# Patient Record
Sex: MALE | Race: WHITE | Employment: OTHER | URBAN - METROPOLITAN AREA
[De-identification: names, ages, dates, MRNs, and addresses within clinical notes are randomized per-mention and may not be internally consistent; named-entity substitution may affect disease eponyms.]

---

## 2018-06-27 ENCOUNTER — HOSPITAL ENCOUNTER (INPATIENT)
Facility: CLINIC | Age: 38
LOS: 3 days | Discharge: HOME | DRG: 580 | End: 2018-06-30
Attending: EMERGENCY MEDICINE | Admitting: INTERNAL MEDICINE
Payer: COMMERCIAL

## 2018-06-27 ENCOUNTER — APPOINTMENT (OUTPATIENT)
Dept: GENERAL RADIOLOGY | Facility: CLINIC | Age: 38
DRG: 580 | End: 2018-06-27
Attending: EMERGENCY MEDICINE
Payer: COMMERCIAL

## 2018-06-27 DIAGNOSIS — L03.113 CELLULITIS OF HAND, RIGHT: ICD-10-CM

## 2018-06-27 PROBLEM — L03.119 CELLULITIS OF HAND: Status: ACTIVE | Noted: 2018-06-27

## 2018-06-27 LAB
ANION GAP SERPL CALCULATED.3IONS-SCNC: 5 MMOL/L (ref 3–14)
BASOPHILS # BLD AUTO: 0 10E9/L (ref 0–0.2)
BASOPHILS NFR BLD AUTO: 0.2 %
BUN SERPL-MCNC: 16 MG/DL (ref 7–30)
CALCIUM SERPL-MCNC: 8.9 MG/DL (ref 8.5–10.1)
CHLORIDE SERPL-SCNC: 105 MMOL/L (ref 94–109)
CO2 SERPL-SCNC: 27 MMOL/L (ref 20–32)
CREAT SERPL-MCNC: 0.78 MG/DL (ref 0.66–1.25)
CRP SERPL-MCNC: 60.3 MG/L (ref 0–8)
DIFFERENTIAL METHOD BLD: ABNORMAL
EOSINOPHIL # BLD AUTO: 0.3 10E9/L (ref 0–0.7)
EOSINOPHIL NFR BLD AUTO: 1.9 %
ERYTHROCYTE [DISTWIDTH] IN BLOOD BY AUTOMATED COUNT: 12.6 % (ref 10–15)
ERYTHROCYTE [SEDIMENTATION RATE] IN BLOOD BY WESTERGREN METHOD: 8 MM/H (ref 0–15)
GFR SERPL CREATININE-BSD FRML MDRD: >90 ML/MIN/1.7M2
GLUCOSE SERPL-MCNC: 104 MG/DL (ref 70–99)
HCT VFR BLD AUTO: 46.3 % (ref 40–53)
HGB BLD-MCNC: 15.5 G/DL (ref 13.3–17.7)
IMM GRANULOCYTES # BLD: 0.1 10E9/L (ref 0–0.4)
IMM GRANULOCYTES NFR BLD: 0.4 %
LYMPHOCYTES # BLD AUTO: 1.9 10E9/L (ref 0.8–5.3)
LYMPHOCYTES NFR BLD AUTO: 11.3 %
MCH RBC QN AUTO: 30.3 PG (ref 26.5–33)
MCHC RBC AUTO-ENTMCNC: 33.5 G/DL (ref 31.5–36.5)
MCV RBC AUTO: 90 FL (ref 78–100)
MONOCYTES # BLD AUTO: 1.6 10E9/L (ref 0–1.3)
MONOCYTES NFR BLD AUTO: 9.9 %
NEUTROPHILS # BLD AUTO: 12.5 10E9/L (ref 1.6–8.3)
NEUTROPHILS NFR BLD AUTO: 76.3 %
NRBC # BLD AUTO: 0 10*3/UL
NRBC BLD AUTO-RTO: 0 /100
PLATELET # BLD AUTO: 423 10E9/L (ref 150–450)
POTASSIUM SERPL-SCNC: 3.8 MMOL/L (ref 3.4–5.3)
RBC # BLD AUTO: 5.12 10E12/L (ref 4.4–5.9)
SODIUM SERPL-SCNC: 137 MMOL/L (ref 133–144)
WBC # BLD AUTO: 16.4 10E9/L (ref 4–11)

## 2018-06-27 PROCEDURE — 36415 COLL VENOUS BLD VENIPUNCTURE: CPT | Performed by: EMERGENCY MEDICINE

## 2018-06-27 PROCEDURE — 99207 ZZC APP CREDIT; MD BILLING SHARED VISIT: CPT | Performed by: PHYSICIAN ASSISTANT

## 2018-06-27 PROCEDURE — 87040 BLOOD CULTURE FOR BACTERIA: CPT | Performed by: EMERGENCY MEDICINE

## 2018-06-27 PROCEDURE — 25000128 H RX IP 250 OP 636: Performed by: EMERGENCY MEDICINE

## 2018-06-27 PROCEDURE — 99285 EMERGENCY DEPT VISIT HI MDM: CPT | Mod: 25

## 2018-06-27 PROCEDURE — 25000132 ZZH RX MED GY IP 250 OP 250 PS 637: Performed by: PHYSICIAN ASSISTANT

## 2018-06-27 PROCEDURE — 85025 COMPLETE CBC W/AUTO DIFF WBC: CPT | Performed by: EMERGENCY MEDICINE

## 2018-06-27 PROCEDURE — 25000128 H RX IP 250 OP 636: Performed by: PHYSICIAN ASSISTANT

## 2018-06-27 PROCEDURE — 85652 RBC SED RATE AUTOMATED: CPT | Performed by: EMERGENCY MEDICINE

## 2018-06-27 PROCEDURE — 80048 BASIC METABOLIC PNL TOTAL CA: CPT | Performed by: EMERGENCY MEDICINE

## 2018-06-27 PROCEDURE — 86140 C-REACTIVE PROTEIN: CPT | Performed by: EMERGENCY MEDICINE

## 2018-06-27 PROCEDURE — 87070 CULTURE OTHR SPECIMN AEROBIC: CPT | Performed by: EMERGENCY MEDICINE

## 2018-06-27 PROCEDURE — 87077 CULTURE AEROBIC IDENTIFY: CPT | Performed by: EMERGENCY MEDICINE

## 2018-06-27 PROCEDURE — 99222 1ST HOSP IP/OBS MODERATE 55: CPT | Mod: AI | Performed by: INTERNAL MEDICINE

## 2018-06-27 PROCEDURE — 96365 THER/PROPH/DIAG IV INF INIT: CPT

## 2018-06-27 PROCEDURE — 96366 THER/PROPH/DIAG IV INF ADDON: CPT

## 2018-06-27 PROCEDURE — 12000007 ZZH R&B INTERMEDIATE

## 2018-06-27 PROCEDURE — 87186 SC STD MICRODIL/AGAR DIL: CPT | Performed by: EMERGENCY MEDICINE

## 2018-06-27 PROCEDURE — 73130 X-RAY EXAM OF HAND: CPT | Mod: RT

## 2018-06-27 PROCEDURE — 36415 COLL VENOUS BLD VENIPUNCTURE: CPT

## 2018-06-27 RX ORDER — FLUTICASONE PROPIONATE 50 MCG
1 SPRAY, SUSPENSION (ML) NASAL DAILY
COMMUNITY

## 2018-06-27 RX ORDER — HYDROMORPHONE HYDROCHLORIDE 1 MG/ML
0.2 INJECTION, SOLUTION INTRAMUSCULAR; INTRAVENOUS; SUBCUTANEOUS
Status: DISCONTINUED | OUTPATIENT
Start: 2018-06-27 | End: 2018-06-30 | Stop reason: HOSPADM

## 2018-06-27 RX ORDER — AMOXICILLIN 250 MG
2 CAPSULE ORAL 2 TIMES DAILY
Status: DISCONTINUED | OUTPATIENT
Start: 2018-06-27 | End: 2018-06-30 | Stop reason: HOSPADM

## 2018-06-27 RX ORDER — KETOROLAC TROMETHAMINE 15 MG/ML
15 INJECTION, SOLUTION INTRAMUSCULAR; INTRAVENOUS ONCE
Status: DISCONTINUED | OUTPATIENT
Start: 2018-06-27 | End: 2018-06-27

## 2018-06-27 RX ORDER — ONDANSETRON 2 MG/ML
4 INJECTION INTRAMUSCULAR; INTRAVENOUS EVERY 6 HOURS PRN
Status: DISCONTINUED | OUTPATIENT
Start: 2018-06-27 | End: 2018-06-30 | Stop reason: HOSPADM

## 2018-06-27 RX ORDER — AMOXICILLIN 250 MG
1 CAPSULE ORAL 2 TIMES DAILY
Status: DISCONTINUED | OUTPATIENT
Start: 2018-06-27 | End: 2018-06-30 | Stop reason: HOSPADM

## 2018-06-27 RX ORDER — CEFAZOLIN SODIUM 1 G/50ML
1 INJECTION, SOLUTION INTRAVENOUS EVERY 8 HOURS
Status: DISCONTINUED | OUTPATIENT
Start: 2018-06-27 | End: 2018-06-30 | Stop reason: HOSPADM

## 2018-06-27 RX ORDER — ACETAMINOPHEN 325 MG/1
975 TABLET ORAL 3 TIMES DAILY
Status: DISCONTINUED | OUTPATIENT
Start: 2018-06-27 | End: 2018-06-30 | Stop reason: HOSPADM

## 2018-06-27 RX ORDER — HYDRALAZINE HYDROCHLORIDE 20 MG/ML
10 INJECTION INTRAMUSCULAR; INTRAVENOUS EVERY 4 HOURS PRN
Status: DISCONTINUED | OUTPATIENT
Start: 2018-06-27 | End: 2018-06-30 | Stop reason: HOSPADM

## 2018-06-27 RX ORDER — PROCHLORPERAZINE 25 MG
25 SUPPOSITORY, RECTAL RECTAL EVERY 12 HOURS PRN
Status: DISCONTINUED | OUTPATIENT
Start: 2018-06-27 | End: 2018-06-30 | Stop reason: HOSPADM

## 2018-06-27 RX ORDER — NALOXONE HYDROCHLORIDE 0.4 MG/ML
.1-.4 INJECTION, SOLUTION INTRAMUSCULAR; INTRAVENOUS; SUBCUTANEOUS
Status: DISCONTINUED | OUTPATIENT
Start: 2018-06-27 | End: 2018-06-30 | Stop reason: HOSPADM

## 2018-06-27 RX ORDER — ONDANSETRON 4 MG/1
4 TABLET, ORALLY DISINTEGRATING ORAL EVERY 6 HOURS PRN
Status: DISCONTINUED | OUTPATIENT
Start: 2018-06-27 | End: 2018-06-30 | Stop reason: HOSPADM

## 2018-06-27 RX ORDER — OXYCODONE HYDROCHLORIDE 5 MG/1
5-10 TABLET ORAL
Status: DISCONTINUED | OUTPATIENT
Start: 2018-06-27 | End: 2018-06-30

## 2018-06-27 RX ORDER — SODIUM CHLORIDE 9 MG/ML
INJECTION, SOLUTION INTRAVENOUS CONTINUOUS
Status: DISCONTINUED | OUTPATIENT
Start: 2018-06-27 | End: 2018-06-28

## 2018-06-27 RX ORDER — IBUPROFEN 600 MG/1
600 TABLET, FILM COATED ORAL EVERY 6 HOURS PRN
Status: DISCONTINUED | OUTPATIENT
Start: 2018-06-27 | End: 2018-06-30 | Stop reason: HOSPADM

## 2018-06-27 RX ORDER — PROCHLORPERAZINE MALEATE 5 MG
10 TABLET ORAL EVERY 6 HOURS PRN
Status: DISCONTINUED | OUTPATIENT
Start: 2018-06-27 | End: 2018-06-30 | Stop reason: HOSPADM

## 2018-06-27 RX ADMIN — ACETAMINOPHEN 975 MG: 325 TABLET, FILM COATED ORAL at 21:14

## 2018-06-27 RX ADMIN — CEFAZOLIN SODIUM 1 G: 1 INJECTION, SOLUTION INTRAVENOUS at 10:41

## 2018-06-27 RX ADMIN — ACETAMINOPHEN 975 MG: 325 TABLET, FILM COATED ORAL at 16:40

## 2018-06-27 RX ADMIN — CEFAZOLIN SODIUM 1 G: 1 INJECTION, SOLUTION INTRAVENOUS at 17:45

## 2018-06-27 RX ADMIN — SODIUM CHLORIDE: 9 INJECTION, SOLUTION INTRAVENOUS at 14:22

## 2018-06-27 ASSESSMENT — ENCOUNTER SYMPTOMS
WEAKNESS: 0
MYALGIAS: 1
JOINT SWELLING: 1
FEVER: 0
WOUND: 1
COLOR CHANGE: 0
NUMBNESS: 0

## 2018-06-27 ASSESSMENT — ACTIVITIES OF DAILY LIVING (ADL)
ADLS_ACUITY_SCORE: 9
ADLS_ACUITY_SCORE: 9

## 2018-06-27 NOTE — PHARMACY-ADMISSION MEDICATION HISTORY
Admission medication history interview status for this patient is complete. See [facility] admission navigator for allergy information, prior to admission medications and immunization status.     Medication history interview source(s):Patient  Medication history resources (including written lists, pill bottles, clinic record):None  Primary pharmacy: none    Changes made to PTA medication list:  Added: Flonase  Deleted:   Changed:     Actions taken by pharmacist (provider contacted, etc):None     Additional medication history information:None    Medication reconciliation/reorder completed by provider prior to medication history? No    For patients on insulin therapy: No    Prior to Admission medications    Medication Sig Last Dose Taking? Auth Provider   fluticasone (FLONASE) 50 MCG/ACT spray Spray 1 spray into both nostrils daily 6/27/2018 at Unknown time Yes Unknown, Entered By History

## 2018-06-27 NOTE — ED PROVIDER NOTES
History     Chief Complaint:  Hand pain    The history is provided by the patient.      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 The patient reports that 10 days ago he pinched his right hand between a couch and door hinge, resulting in a laceration to the dorsal aspect of his hand. He cleaned the area thoroughly and put a Steri Strip over the laceration. Patient denies any pain or swelling of the hand until yesterday. Hand is still swollen today though states the pain has lessened from yesterday and is currently mild and non-radiating. He reports clear fluid drainage from the wound but no pus. Patient is able to fully extend all fingers but notes increased pain with extension of 2nd-4th digits, mostly between the 3rd and 4th fingers. Flexion results in no pain. Patient has no history of infections or diabetes, fever, vomiting, or any other symptoms. Last tetanus was 5 years ago.    Allergies:  Amoxicillin - does not know reaction and mother cannot remember if he is allergic or not    Medications:    The patient is not currently taking any prescribed medications.     Past Medical History:    The patient does not have any past pertinent medical history.    Past Surgical History:    History reviewed. No pertinent surgical history.    Family History:    History reviewed. No pertinent family history.     Social History:  Smoking status: Never  Alcohol use: Yes  Works laying [employer]    Review of Systems   Constitutional: Negative for fever.   Musculoskeletal: Positive for joint swelling (right hand) and myalgias (right hand).   Skin: Positive for wound. Negative for color change.   Neurological: Negative for weakness and numbness.   All other systems reviewed and are negative.    Physical Exam   Patient Vitals for the past 24 hrs:   BP Temp Temp src Pulse Resp SpO2 Height Weight   06/27/18 0902 (!)  "171/106 99.8  F (37.7  C) Oral 85 16 96 % 1.803 m (5' 11\") 77.6 kg (171 lb)     Physical Exam  General: Well-developed and well-nourished. Well appearing young  man. Cooperative.  Head:  Atraumatic.  Eyes:  Conjunctivae, lids, and sclerae are normal.  ENT:    Normal nose. Moist mucous membranes.  Neck:  Supple. Normal range of motion.  CV:  Right radial pulse 2+  Resp:  No respiratory distress.   GI:  Non-distended.    MS:  Normal ROM. Moderate-severe edema on the dorsum of the right hand with serous drainage from a 1 cm linear wound. Warm as compared to left hand and mildly erythematous. No significant pain with passive extension, though pain with active extension on digits 2-4. No pain with flexion and no thumb involvement.   Skin:  Warm. Non-diaphoretic. No pallor. Wound, erythema as above.   Neuro:  Awake. A&Ox3. Normal strength. Sensation intact to light touch throughout with radial, median, and ulnar nerve testing intact.  Psych: Normal mood and affect. Normal speech.  Vitals reviewed.    Emergency Department Course     Imaging:  Radiographic findings were communicated with the patient who voiced understanding of the findings.    X-ray Right Hand, 3 views:  IMPRESSION: Old fracture deformity of the fifth metacarpal. Exam  otherwise negative.  Result per radiology.     Laboratory:  Wound culture aerobic bacterial: Pending  CBC: WBC 16.4 (H) o/w WNL (HGB 15.5, )  BMP: Glucose 104 (H) o/w WNL (Creatinine 0.78)  Blood Culture x2: Pending  CRP inflammation: 60.3 (H)  SED rate: 8    Interventions:  1041: Ancef 1g IV intermittent infusion    Emergency Department Course:  Past medical records, nursing notes, and vitals reviewed.  0927: I performed an exam of the patient and obtained history, as documented above.     IV inserted and blood drawn.    The patient was sent for a x-ray while in the emergency department, findings above.    Findings and plan explained to the patient who consents to admission. "     1108: Discussed the patient with АЛЕКСАНДР Salmon, who will admit the patient to an inpatient bed for further monitoring, evaluation, and treatment.     Impression & Plan      Medical Decision Making:  Abdon is a 38-year-old man who is a right-handed  who presents with right hand swelling and pain.  Patient has sustained a laceration approximately 10 days ago which he cleaned himself and was doing well until yesterday when the swelling and pain started.  On exam he has moderate to severe edema of the dorsum of the hand with a roughly 1 cm laceration that is draining serous fluid.  I was unable to express purulent discharge and I did obtain a wound culture at the site.  He has no pain with passive extension of the digits though he endorses pain with active extension of digits 2 through 4. There is no involvement of the thumb and he is otherwise neurovascularly intact.  Blood cultures were obtained and patient was empirically treated with Ancef.  While ESR is normal, CRP is elevated at 60.3 and patient has a leukocytosis to 16.4.  The remainder of his labs are unremarkable.  Fortunately, x-ray of the hand reveals no subcutaneous edema or obvious evidence of osteomyelitis.  However, particularly as this is on his dominant hand and has early signs of deeper infection given pain with extension, I believe patient warrants admission for IV antibiotics and further evaluation by hand surgery.  I discussed this plan with him as well as the results of the laboratory and imaging studies and answered all his questions.  He verbalized understanding and is amenable.  I discussed patient's case with Stacia Mclean, hospitalist PA, who accepts admission and has no further orders.    Diagnosis:    ICD-10-CM    1. Cellulitis of hand, right L03.113 Blood culture     Blood culture     CRP inflammation     CRP inflammation     Erythrocyte sedimentation rate auto     Erythrocyte sedimentation rate auto     CANCELED:  Erythrocyte sedimentation rate auto     CANCELED: CRP inflammation     CANCELED: Erythrocyte sedimentation rate auto     CANCELED: CRP inflammation       Disposition:  Admitted to АЛЕКСАНДР Salomn.      Kay Walker  6/27/2018   [facility] EMERGENCY DEPARTMENT  I, Kay Walker, am serving as a scribe at 9:27 AM on 6/27/2018 to document services personally performed by Luisana Linda MD based on my observations and the provider's statements to me.        Luisana Linda MD  06/27/18 0831

## 2018-06-27 NOTE — IP AVS SNAPSHOT
[place] [address] [facility]    [address]    [facility] [address]    Phone:  [phone]    Fax:  [phone]                                       After Visit Summary   6/27/2018    Abdon Wang    MRN: [id number]           After Visit Summary Signature Page     I have received my discharge instructions, and my questions have been answered. I have discussed any challenges I see with this plan with the nurse or doctor.    ..........................................................................................................................................  Patient/Patient Representative Signature      ..........................................................................................................................................  Patient Representative Print Name and Relationship to Patient    ..................................................               ................................................  Date                                            Time    ..........................................................................................................................................  Reviewed by Signature/Title    ...................................................              ..............................................  Date                                                            Time

## 2018-06-27 NOTE — ED NOTES
"[facility]  ED Nurse Handoff Report    Abdon Wang is a 38 year old male   ED Chief complaint: Hand Pain  . ED Diagnosis:   Final diagnoses:   Cellulitis of hand, right     Allergies:   Allergies   Allergen Reactions     Amoxicillin        Code Status: Full Code  Activity level - Baseline/Home:  Independent. Activity Level - Current:   Independent. Lift room needed: No. Bariatric: No   Needed: No   Isolation: No. Infection: Not Applicable.     Vital Signs:   Vitals:    06/27/18 0902   BP: (!) 171/106   Pulse: 85   Resp: 16   Temp: 99.8  F (37.7  C)   TempSrc: Oral   SpO2: 96%   Weight: 77.6 kg (171 lb)   Height: 1.803 m (5' 11\")       Cardiac Rhythm:  ,      Pain level: 0-10 Pain Scale: 2  Patient confused: No. Patient Falls Risk: Yes.   Elimination Status: Has voided   Patient Report - Initial Complaint: laceration on hand and now swollen . Focused Assessment:  Skin - Skin WDL:  WDL except Skin Turgor: slow return to original state Inspection of bony prominences: Procedural focused assessment (identify areas inspected) Skin Integrity: intact; incision(s) Skin Comment: Pt presents with having a skin laceration on the right hand now having swelling associated with it.   Tests Performed:   XR Hand Right G/E 3 Views   Final Result   IMPRESSION: Old fracture deformity of the fifth metacarpal. Exam   otherwise negative.      KIT ABEBE MD        Labs Ordered and Resulted from Time of ED Arrival Up to the Time of Departure from the ED   CBC WITH PLATELETS DIFFERENTIAL - Abnormal; Notable for the following:        Result Value    WBC 16.4 (*)     Absolute Neutrophil 12.5 (*)     Absolute Monocytes 1.6 (*)     All other components within normal limits   BASIC METABOLIC PANEL - Abnormal; Notable for the following:     Glucose 104 (*)     All other components within normal limits   ERYTHROCYTE SEDIMENTATION RATE AUTO   CRP INFLAMMATION   PERIPHERAL IV CATHETER   BLOOD CULTURE   WOUND " CULTURE AEROBIC BACTERIAL   BLOOD CULTURE     .   Treatments provided:   Medications   ketorolac (TORADOL) injection 15 mg (not administered)   ceFAZolin (ANCEF) intermittent infusion 1 g (pre-mix) (1 g Intravenous New Bag 6/27/18 1041)       Family Comments: rah at bedside.   OBS brochure/video discussed/provided to patient:  No  ED Medications:   Medications   ketorolac (TORADOL) injection 15 mg (not administered)   ceFAZolin (ANCEF) intermittent infusion 1 g (pre-mix) (1 g Intravenous New Bag 6/27/18 1041)     Drips infusing:  No  For the majority of the shift, the patient's behavior Green. Interventions performed were monitor  .     Severe Sepsis OR Septic Shock Diagnosis Present: No      ED Nurse Name/Phone Number: Braxton Howell,   11:37 AM    RECEIVING UNIT ED HANDOFF REVIEW    Above ED Nurse Handoff Report was reviewed: Yes  Reviewed by: Lara Talbert on June 27, 2018 at 12:56 PM

## 2018-06-27 NOTE — CONSULTS
[facility] Orthopedic Consultation    Abdon Wang MRN# [id number]   Age: 38 year old YOB: 1980     Date of Admission:  6/27/2018    Reason for consult: Right hand swelling and cellulitis, evaluate if need for I & D       Requesting physician: Stacia Mclean PA-C        Level of consult: Consult, follow and place orders           Assessment and Plan:   Assessment:   1) Right hand cellulitis- no focal fluid collection appreciated but will follow closely.         Plan:   1) I discussed case and plan with ortho trauma surgeon, Dr. Franks:  Recommend soaking right hand in warm, soapy water for 20 minutes 3x per day.   2) No plan for surgery today- ok for patient to eat.  3) Will plan to re-assess tomorrow- keep NPO at midnight tonight in case surgical intervention is needed.   4) Continue IV antibiotics per Hospitalist              Chief Complaint:   Right hand pain and swelling          History of Present Illness:   This patient is a 38 year old male who presents with the following condition requiring a hospital admission:    Patient reports that about 10 days ago he was helping move a couch when he struck the back of his right hand against a door hinge causing a laceration. He reports cleaning the wound a couple times with hydrogen peroxide and covering it with a band aid. He denies any problems with the wound until about midday yesterday when the back of his hand began swelling up and becoming more red. The swelling and redness then extended further into his knuckles and fingers today prompting him to come to ED for further evaluation. He reports a small amount of clear drainage from laceration but no pus. X-rays in ED showed old fracture of fifth metacarpal but were otherwise negative. Patient did have low grade temp as well as leukocytosis to 16.4, elevated CRP 60.3 and normal ESR of 8. Wound culture and blood cultures taken in ED are still pending. Patient is seen in his hospital room.  He is right hand dominant. He denies fevers/chills.  He has moderate edema with erythema along dorsum of right hand extending distally over the 2nd-4th MCP joints to 2nd-4th PIP joints and proximally over 2nd-4th metacarpals and ending at wrist. He has an approximately 1cm open laceration distal to 2nd MCP joint that is draining a small amount of serous fluid.  He reports minor 2/10 pain along dorsum of hand. The pain increases slightly with palpation over 2nd and 3rd MCP joints and proximally over 2nd and 3rd metacarpals. He has mild pain with flexion/extension of his fingers. He is unable to fully extend the fingers. Denies any numbness/tingling in fingers. He denies any pain in wrist or decreased wrist ROM. Denies any streaking up arm.           Past Medical History:   History reviewed. No pertinent past medical history.          Past Surgical History:   History reviewed. No pertinent surgical history.          Social History:     Social History   Substance Use Topics     Smoking status: Never Smoker     Smokeless tobacco: Never Used     Alcohol use Yes             Family History:   No family history on file.          Immunizations:     VACCINE/DOSE   Diptheria   DPT   DTAP   HBIG   Hepatitis A   Hepatitis B   HIB   Influenza   Measles   Meningococcal   MMR   Mumps   Pneumococcal   Polio   Rubella   Small Pox   TDAP   Varicella   Zoster             Allergies:     Allergies   Allergen Reactions     Amoxicillin              Medications:     Current Facility-Administered Medications   Medication     acetaminophen (TYLENOL) tablet 975 mg     ceFAZolin (ANCEF) intermittent infusion 1 g     hydrALAZINE (APRESOLINE) injection 10 mg     HYDROmorphone (PF) (DILAUDID) injection 0.2 mg     ibuprofen (ADVIL/MOTRIN) tablet 600 mg     melatonin tablet 1 mg     naloxone (NARCAN) injection 0.1-0.4 mg     ondansetron (ZOFRAN-ODT) ODT tab 4 mg    Or     ondansetron (ZOFRAN) injection 4 mg     oxyCODONE IR (ROXICODONE) tablet 5-10  "mg     prochlorperazine (COMPAZINE) injection 10 mg    Or     prochlorperazine (COMPAZINE) tablet 10 mg    Or     prochlorperazine (COMPAZINE) Suppository 25 mg     senna-docusate (SENOKOT-S;PERICOLACE) 8.6-50 MG per tablet 1 tablet    Or     senna-docusate (SENOKOT-S;PERICOLACE) 8.6-50 MG per tablet 2 tablet     sodium chloride 0.9% infusion             Review of Systems:   CV: NEGATIVE for chest pain, palpitations or peripheral edema  C: NEGATIVE for fever, chills, change in weight  E/M: NEGATIVE for ear, mouth and throat problems  R: NEGATIVE for significant cough or SOB          Physical Exam:   All vitals have been reviewed  Patient Vitals for the past 24 hrs:   BP Temp Temp src Pulse Resp SpO2 Height Weight   06/27/18 1313 146/81 97.8  F (36.6  C) Oral 73 16 98 % 1.803 m (5' 11\") 78.2 kg (172 lb 4.8 oz)   06/27/18 0902 (!) 171/106 99.8  F (37.7  C) Oral 85 16 96 % 1.803 m (5' 11\") 77.6 kg (171 lb)     No intake or output data in the 24 hours ending 06/27/18 1432  Musculoskeletal:   RUE: Moderate edema with erythema along dorsum of right hand extending distally over the 2nd-4th MCP joints to 2nd-4th PIP joints and proximally over 2nd-4th metacarpals and ending at wrist.   1cm open laceration distal to 2nd MCP joint that is draining a small amount of serous fluid  - No focal fluid collection appreciated  +TTP over 2nd and 3rd MCP joints and proximally over 2nd and 3rd metacarpals  - Neg TTP along wrist or elbow   - Mild pain with passive extension/flexion of fingers- unable to completely extend fingers   - Distal CMS intact in m/r/u distributions   - No pain with wrist ROM  - No lymphangitis   - 2+ radial pulse, cap refill < 3 sec              Data:   All laboratory data reviewed  Results for orders placed or performed during the hospital encounter of 06/27/18   XR Hand Right G/E 3 Views    Narrative    XR HAND RT G/E 3 VW 6/27/2018 11:04 AM    HISTORY: laceration, cellulitis, r/o fx, emphysema, etc.;       " Impression    IMPRESSION: Old fracture deformity of the fifth metacarpal. Exam  otherwise negative.    KIT ABEBE MD   CBC with platelets differential   Result Value Ref Range    WBC 16.4 (H) 4.0 - 11.0 10e9/L    RBC Count 5.12 4.4 - 5.9 10e12/L    Hemoglobin 15.5 13.3 - 17.7 g/dL    Hematocrit 46.3 40.0 - 53.0 %    MCV 90 78 - 100 fl    MCH 30.3 26.5 - 33.0 pg    MCHC 33.5 31.5 - 36.5 g/dL    RDW 12.6 10.0 - 15.0 %    Platelet Count 423 150 - 450 10e9/L    Diff Method Automated Method     % Neutrophils 76.3 %    % Lymphocytes 11.3 %    % Monocytes 9.9 %    % Eosinophils 1.9 %    % Basophils 0.2 %    % Immature Granulocytes 0.4 %    Nucleated RBCs 0 0 /100    Absolute Neutrophil 12.5 (H) 1.6 - 8.3 10e9/L    Absolute Lymphocytes 1.9 0.8 - 5.3 10e9/L    Absolute Monocytes 1.6 (H) 0.0 - 1.3 10e9/L    Absolute Eosinophils 0.3 0.0 - 0.7 10e9/L    Absolute Basophils 0.0 0.0 - 0.2 10e9/L    Abs Immature Granulocytes 0.1 0 - 0.4 10e9/L    Absolute Nucleated RBC 0.0    Basic metabolic panel   Result Value Ref Range    Sodium 137 133 - 144 mmol/L    Potassium 3.8 3.4 - 5.3 mmol/L    Chloride 105 94 - 109 mmol/L    Carbon Dioxide 27 20 - 32 mmol/L    Anion Gap 5 3 - 14 mmol/L    Glucose 104 (H) 70 - 99 mg/dL    Urea Nitrogen 16 7 - 30 mg/dL    Creatinine 0.78 0.66 - 1.25 mg/dL    GFR Estimate >90 >60 mL/min/1.7m2    GFR Estimate If Black >90 >60 mL/min/1.7m2    Calcium 8.9 8.5 - 10.1 mg/dL   CRP inflammation   Result Value Ref Range    CRP Inflammation 60.3 (H) 0.0 - 8.0 mg/L   Erythrocyte sedimentation rate auto   Result Value Ref Range    Sed Rate 8 0 - 15 mm/h   Blood culture   Result Value Ref Range    Specimen Description Blood Left Hand     Special Requests Aerobic and anaerobic bottles received     Culture Micro No growth after 1 hour    Wound Culture Aerobic Bacterial   Result Value Ref Range    Specimen Description Wound Hand     Special Requests Specimen collected in eSwab transport (white cap)     Culture Micro  PENDING    Blood culture   Result Value Ref Range    Specimen Description Blood Right Arm     Special Requests Aerobic and anaerobic bottles received     Culture Micro No growth after 1 hour           Attestation:  I have reviewed today's vital signs, notes, medications, labs and imaging with Dr. Franks.  Amount of time performed on this consult: 40 minutes.    EDITH Odell CNP

## 2018-06-27 NOTE — IP AVS SNAPSHOT
MRN:[id number]                      After Visit Summary   6/27/2018    Abdon Wang    MRN: [id number]           Thank you!     Thank you for choosing [facility] for your care. Our goal is always to provide you with excellent care. Hearing back from our patients is one way we can continue to improve our services. Please take a few minutes to complete the written survey that you may receive in the mail after you visit. If you would like to speak to someone directly about your visit please contact Patient Relations at [phone]. Thank you!          Patient Information     Date Of Birth          1980        Designated Caregiver       Most Recent Value    Caregiver    Will someone help with your care after discharge? yes    Name of designated caregiver Mindi    Caregiver address home      About your hospital stay     You were admitted on:  June 27, 2018 You last received care in the:  [place] [address] [facility]    You were discharged on:  June 30, 2018        Reason for your hospital stay       Irrigation and debridement and incision and drainage of right hand abscess.                  Who to Call     For medical emergencies, please call 911.  For non-urgent questions about your medical care, please call your primary care provider or clinic, None  For questions related to your surgery, please call your surgery clinic        Attending Provider     Provider Specialty    Luisana Linda MD Emergency Medicine    [facility], MD [facility]       Primary Care Provider Fax #    Physician No Ref-Primary [phone]       When to contact your care team       Call your Orthopedic Surgeon's office [phone] during business hours or [phone] after hours if you have any of the following: fevers/chills with temperature greater than 101.5, increased drainage, increased swelling, increased numbness/tingling or increased, uncontrolled pain in your right hand/fingers.                   After Care Instructions     Activity       Your activity upon discharge: activity as tolerated, Do gentle right finger ROM exercises several times each day to decrease stiffness Avoid any strenuous activity or lifting with right hand.            Wound care and dressings       Instructions to care for your wound at home: Continue warm soapy water soaks to right hand for 20 minutes 3x per day as wound heals over next 1-2 weeks. Apply slightly moistened gauze over wound and then cover with dry gauze, kerlix and ACE wrap. .                  Follow-up Appointments     Follow-up and recommended labs and tests        Please make a follow up appointment with your Orthopedic Surgeon, Dr. Maxim Franks, for the week of July 16, 2018 if possible. Call Dr. Franks's care coordinator, Petra, at [phone] to schedule this appointment.                  Pending Results     Date and Time Order Name Status Description    6/28/2018 1132 Wound Culture Aerobic Bacterial Preliminary     6/28/2018 1132 Anaerobic bacterial culture Preliminary     6/27/2018 0950 Blood culture Preliminary     6/27/2018 0945 Wound Culture Aerobic Bacterial Preliminary     6/27/2018 0945 Blood culture Preliminary             Statement of Approval     Ordered          06/30/18 1041  I have reviewed and agree with all the recommendations and orders detailed in this document.  EFFECTIVE NOW     Approved and electronically signed by:  Zurdo Fox MD           06/30/18 1042  I have reviewed and agree with all the recommendations and orders detailed in this document.  EFFECTIVE NOW     Approved and electronically signed by:  Zurdo Fox MD             Admission Information     Date & Time Provider Department Dept. Phone    6/27/2018 [facility] MD [place] [address] [facility] [phone]      Your Vitals Were     Blood Pressure Pulse Temperature Respirations Height Weight    135/72 (BP Location: Left arm) 65 97.9  F (36.6  C)  "(Oral) 16 1.803 m (5' 11\") 78.2 kg (172 lb 4.8 oz)    Pulse Oximetry BMI (Body Mass Index)                98% 24.03 kg/m2          [employer] Information     [employer] lets you send messages to your doctor, view your test results, renew your prescriptions, schedule appointments and more. To sign up, go to [email or web address] . Click on \"Log in\" on the left side of the screen, which will take you to the Welcome page. Then click on \"Sign up Now\" on the right side of the page.     You will be asked to enter the access code listed below, as well as some personal information. Please follow the directions to create your username and password.     Your access code is: [email or web address]  Expires: 2018 10:43 AM     Your access code will  in 90 days. If you need help or a new code, please call your [place] clinic or [phone].        Care EveryWhere ID     This is your Care EveryWhere ID. This could be used by other organizations to access your [place] medical records  [id number]        Equal Access to Services     LEOPOLDO REEDER AH: Hadii tommie Doe, waashleyda leonardo, qaybta kaalmada rip, finn horton. So [facility] [phone].    ATENCIÓN: Si habla español, tiene a lópez disposición servicios gratuitos de asistencia lingüística. Santana al [phone].    We comply with applicable federal civil rights laws and [place] laws. We do not discriminate on the basis of race, color, national origin, age, disability, sex, sexual orientation, or gender identity.               Review of your medicines      START taking        Dose / Directions    cephALEXin 500 MG capsule   Commonly known as:  KEFLEX   Used for:  Cellulitis of hand, right        Dose:  500 mg   Take 1 capsule (500 mg) by mouth 3 times daily for 10 days   Quantity:  30 capsule   Refills:  0         CONTINUE these medicines which have NOT CHANGED        Dose / Directions    fluticasone 50 MCG/ACT spray   Commonly known " as:  FLONASE        Dose:  1 spray   Spray 1 spray into both nostrils daily   Refills:  0            Where to get your medicines      These medications were sent to [id number] - [place], [place] - [address]  [address], [facility] [address]     Phone:  [phone]     cephALEXin 500 MG capsule                Protect others around you: Learn how to safely use, store and throw away your medicines at www.disposemymeds.org.        ANTIBIOTIC INSTRUCTION     You've Been Prescribed an Antibiotic - Now What?  Your healthcare team thinks that you or your loved one might have an infection. Some infections can be treated with antibiotics, which are powerful, life-saving drugs. Like all medications, antibiotics have side effects and should only be used when necessary. There are some important things you should know about your antibiotic treatment.      Your healthcare team may run tests before you start taking an antibiotic.    Your team may take samples (e.g., from your blood, urine or other areas) to run tests to look for bacteria. These test can be important to determine if you need an antibiotic at all and, if you do, which antibiotic will work best.      Within a few days, your healthcare team might change or even stop your antibiotic.    Your team may start you on an antibiotic while they are working to find out what is making you sick.    Your team might change your antibiotic because test results show that a different antibiotic would be better to treat your infection.    In some cases, once your team has more information, they learn that you do not need an antibiotic at all. They may find out that you don't have an infection, or that the antibiotic you're taking won't work against your infection. For example, an infection caused by a virus can't be treated with antibiotics. Staying on an antibiotic when you don't need it is more likely to be harmful than helpful.      You may experience side  effects from your antibiotic.    Like all medications, antibiotics have side effects. Some of these can be serious.    Let you healthcare team know if you have any known allergies when you are admitted to the hospital.    One significant side effect of nearly all antibiotics is the risk of severe and sometimes deadly diarrhea caused by Clostridium difficile (C. Difficile). This occurs when a person takes antibiotics because some good germs are destroyed. Antibiotic use allows C. diificile to take over, putting patients at high risk for this serious infection.    As a patient or caregiver, it is important to understand your or your loved one's antibiotic treatment. It is especially important for caregivers to speak up when patients can't speak for themselves. Here are some important questions to ask your healthcare team.    What infection is this antibiotic treating and how do you know I have that infection?    What side effects might occur from this antibiotic?    How long will I need to take this antibiotic?    Is it safe to take this antibiotic with other medications or supplements (e.g., vitamins) that I am taking?     Are there any special directions I need to know about taking this antibiotic? For example, should I take it with food?    How will I be monitored to know whether my infection is responding to the antibiotic?    What tests may help to make sure the right antibiotic is prescribed for me?      Information provided by:  www.cdc.gov/getsmart  U.S. Department of Health and Human Services  Centers for disease Control and Prevention  National Center for Emerging and Zoonotic Infectious Diseases  Division of Healthcare Quality Promotion             Medication List: This is a list of all your medications and when to take them. Check marks below indicate your daily home schedule. Keep this list as a reference.      Medications           Morning Afternoon Evening Bedtime As Needed    cephALEXin 500 MG capsule    Commonly known as:  KEFLEX   Take 1 capsule (500 mg) by mouth 3 times daily for 10 days                                fluticasone 50 MCG/ACT spray   Commonly known as:  FLONASE   Spray 1 spray into both nostrils daily

## 2018-06-27 NOTE — H&P
History and Physical     Abdon Wang MRN# [id number]   YOB: 1980 Age: 38 year old      Date of Admission:  6/27/2018    Primary care provider: None           Assessment and Plan:   Abdon Wang is a 38 year old right-handed male (who works as a ) without any significant PMH who presents with right hand cellulitis after an injury 10 days ago. He presents with significant edema and erythema over the dorsum of his right hand along with leukocytosis of 16,000 and low-grade fevers.  He will be admitted to the hospital for IV antibiotics, supportive therapies, consultation with hand surgery.    1 Right hand cellulitis status post recent injury-he had sustained a laceration to the dorsum of his hand when his hand was caught between a couch in a door hinge.  He had clean it up himself with hydrogen peroxide and Steri-Stripped.  There is no signs of erythema or swelling until last night.  Today his entire dorsum of his right hand is erythematous and swollen with clear serous drainage coming from his old laceration site.  He has tenderness to palpation over the MCP joint of his middle finger but no significant pain of his other joints with active or passive movements.  Given the extent of his swelling leukocytosis and low-grade fever, he will need to be admitted to the hospital for IV antibiotics and evaluation with orthopedic surgery. He does not appear to have evidence of septic joint or evidence of tenosynovitis at this point that would require immediate surgical intervention.  -Continue right hand elevation and IV Ancef for now.  Pain control as needed  -Last tetanus shot was 5 years ago.    2. HTN: BP elevated in ED, likely 2/2 pain. No hx of HTN.   Will have PRN hydralazine available.     Admit to inpatient status as I expect he will need at least 2 days of IV antibiotics and close monitoring  DVT prophylaxis-low risk encourage ambulation  Full code he has no previous history of  "cellulitis or any infection                Chief Complaint:   Right hand swelling         History of Present Illness:   Abdon Wang is a 38 year old right-handed male who presents with right hand swelling after an injury 10 days ago.  Abdon is a healthy gentleman with no significant past medical history.  He works as a  and is very active with his hands.  10 days ago he had his right hand caught in between a couch in a door hinge and ultimately sustained a 3 cm laceration to the dorsum of his hand.  He had clean it up himself with hydrogen peroxide and Steri-Strip and had no issues with it after.  However yesterday he noticed some swelling from his knuckles and extending to his wrist.  By this morning he had more erythema and swelling to his fingertip and all the way past his wrist that prompted him to come to the emergency room.  He denies any fevers or chills.  He states that his hand is a little tender and feels \"full and tight\"but is able to make a fist with his hand and no significant pain with passive movement.  Workup in the emergency room reveals a white count of 16,000, he had a low-grade temperature of 99.8.    ESR and CRP obtained showed elevated CRP. X-ray performed showed old fracture of the fifth metacarpal.  He had no previous history of cellulitis or skin infection in the past.  His last tetanus shot was 5 years ago.  He was started on IV Ancef and was recommended for admission to the hospital.             Past Medical History:   History reviewed. No pertinent past medical history.            Past Surgical History:   History reviewed. No pertinent surgical history.            Social History:     Social History     Social History     Marital status: Single     Spouse name: N/A     Number of children: N/A     Years of education: N/A     Occupational History     Not on file.     Social History Main Topics     Smoking status: Never Smoker     Smokeless tobacco: Never Used     Alcohol " "use Yes     Drug use: No     Sexual activity: Not on file     Other Topics Concern     Not on file     Social History Narrative     No narrative on file               Family History:   Reviewed and non contributory         Allergies:      Allergies   Allergen Reactions     Amoxicillin                Medications:     Prior to Admission medications    Medication Sig Last Dose Taking? Auth Provider   fluticasone (FLONASE) 50 MCG/ACT spray Spray 1 spray into both nostrils daily 6/27/2018 at Unknown time Yes Unknown, Entered By History              Review of Systems:   A Comprehensive greater than 10 system review of systems was carried out.  Pertinent positives and negatives are noted above.  Otherwise negative for contributory information.            Physical Exam:   Blood pressure (!) 171/106, pulse 85, temperature 99.8  F (37.7  C), temperature source Oral, resp. rate 16, height 1.803 m (5' 11\"), weight 77.6 kg (171 lb), SpO2 96 %.  Exam:  GENERAL:  Comfortable. Non-toxic  PSYCH: pleasant, oriented, No acute distress.  HEENT:  PERRLA. Normal conjunctiva, normal hearing, nasal mucosa and Oropharynx are normal.  NECK:  Supple, no neck vein distention, adenopathy or bruits, normal thyroid.  HEART:  Normal S1, S2 with no murmur, no pericardial rub, gallops or S3 or S4.  LUNGS:  Clear to auscultation, normal Respiratory effort. No wheezing, rales or ronchi.  ABDOMEN:  Soft, no hepatosplenomegaly, normal bowel sounds. Non-tender, non distended.   EXTREMITIES:  No pedal edema,   Right hand with erythema from his PIP joints down to past wrist. Extensive swelling over the same areas especially over the dorsum of the hand. He has a 3 cm healing laceration over the dorsum of the 2-3 MDP with small serous drainage.   Pain with palpation of the middle MCP joint but no significant pain with active and passive extension of his fingers.   NEUROLOGIC:  CN 2-12 intact, BL 5/5 symmetric upper and lower extremity strength, sensation is " intact with no focal deficits.           Data:       Recent Labs  Lab 06/27/18  0911   WBC 16.4*   HGB 15.5   HCT 46.3   MCV 90             Lab Results   Component Value Date     06/27/2018    Lab Results   Component Value Date    CHLORIDE 105 06/27/2018    Lab Results   Component Value Date    BUN 16 06/27/2018      Lab Results   Component Value Date    POTASSIUM 3.8 06/27/2018    Lab Results   Component Value Date    CO2 27 06/27/2018    Lab Results   Component Value Date    CR 0.78 06/27/2018          Recent Labs  Lab 06/27/18  0959 06/27/18  0911   CULT No growth after 1 hour No growth after 1 hour       Recent Labs  Lab 06/27/18  0911   SED 8   CRP 60.3*         Results for orders placed or performed during the hospital encounter of 06/27/18   XR Hand Right G/E 3 Views    Narrative    XR HAND RT G/E 3 VW 6/27/2018 11:04 AM    HISTORY: laceration, cellulitis, r/o fx, emphysema, etc.;       Impression    IMPRESSION: Old fracture deformity of the fifth metacarpal. Exam  otherwise negative.    MD Stacia DENNIS PA-C    This patient was seen and discussed with Dr. Stewart who agrees with the current plans as outlined above.

## 2018-06-27 NOTE — Clinical Note
Admitting Physician: ROBSON PEREZ [[id number]]   [facility] [[id number][facility]   Bed Type: Adult Med/Surg [46]   Special needs: Fall Risk [8]   Bed request comments: BED REQUEST IN AT 4232

## 2018-06-27 NOTE — PLAN OF CARE
Problem: Patient Care Overview  Goal: Plan of Care/Patient Progress Review  Outcome: No Change  Ao, vss. Minimal pain. Right hand outline, dressing applied. IVF infusing

## 2018-06-27 NOTE — ED TRIAGE NOTES
Swelling and pain in right hand that began yesterday.  Small laceration 10 days ago.  Pinched hand between furniture and hinge while working. Last tetranus 5 years ago. Patient alert and oriented x3.  Airway, breathing and circulation intact.

## 2018-06-28 ENCOUNTER — ANESTHESIA (OUTPATIENT)
Dept: SURGERY | Facility: CLINIC | Age: 38
DRG: 580 | End: 2018-06-28
Payer: COMMERCIAL

## 2018-06-28 ENCOUNTER — ANESTHESIA EVENT (OUTPATIENT)
Dept: SURGERY | Facility: CLINIC | Age: 38
DRG: 580 | End: 2018-06-28
Payer: COMMERCIAL

## 2018-06-28 LAB
BASOPHILS # BLD AUTO: 0 10E9/L (ref 0–0.2)
BASOPHILS NFR BLD AUTO: 0.2 %
DIFFERENTIAL METHOD BLD: ABNORMAL
EOSINOPHIL # BLD AUTO: 0.2 10E9/L (ref 0–0.7)
EOSINOPHIL NFR BLD AUTO: 1.9 %
ERYTHROCYTE [DISTWIDTH] IN BLOOD BY AUTOMATED COUNT: 12.6 % (ref 10–15)
GRAM STN SPEC: ABNORMAL
GRAM STN SPEC: ABNORMAL
HCT VFR BLD AUTO: 43.1 % (ref 40–53)
HGB BLD-MCNC: 14.4 G/DL (ref 13.3–17.7)
IMM GRANULOCYTES # BLD: 0 10E9/L (ref 0–0.4)
IMM GRANULOCYTES NFR BLD: 0.3 %
LYMPHOCYTES # BLD AUTO: 1.7 10E9/L (ref 0.8–5.3)
LYMPHOCYTES NFR BLD AUTO: 13 %
Lab: ABNORMAL
MCH RBC QN AUTO: 31.1 PG (ref 26.5–33)
MCHC RBC AUTO-ENTMCNC: 33.4 G/DL (ref 31.5–36.5)
MCV RBC AUTO: 93 FL (ref 78–100)
MONOCYTES # BLD AUTO: 1.3 10E9/L (ref 0–1.3)
MONOCYTES NFR BLD AUTO: 9.9 %
NEUTROPHILS # BLD AUTO: 9.7 10E9/L (ref 1.6–8.3)
NEUTROPHILS NFR BLD AUTO: 74.7 %
NRBC # BLD AUTO: 0 10*3/UL
NRBC BLD AUTO-RTO: 0 /100
PLATELET # BLD AUTO: 347 10E9/L (ref 150–450)
RBC # BLD AUTO: 4.63 10E12/L (ref 4.4–5.9)
SPECIMEN SOURCE: ABNORMAL
WBC # BLD AUTO: 13 10E9/L (ref 4–11)

## 2018-06-28 PROCEDURE — 36000058 ZZH SURGERY LEVEL 3 EA 15 ADDTL MIN: Performed by: ORTHOPAEDIC SURGERY

## 2018-06-28 PROCEDURE — 25000128 H RX IP 250 OP 636: Performed by: ANESTHESIOLOGY

## 2018-06-28 PROCEDURE — 37000009 ZZH ANESTHESIA TECHNICAL FEE, EACH ADDTL 15 MIN: Performed by: ORTHOPAEDIC SURGERY

## 2018-06-28 PROCEDURE — 25000128 H RX IP 250 OP 636: Performed by: PHYSICIAN ASSISTANT

## 2018-06-28 PROCEDURE — 87205 SMEAR GRAM STAIN: CPT | Performed by: ORTHOPAEDIC SURGERY

## 2018-06-28 PROCEDURE — 87075 CULTR BACTERIA EXCEPT BLOOD: CPT | Performed by: ORTHOPAEDIC SURGERY

## 2018-06-28 PROCEDURE — 85025 COMPLETE CBC W/AUTO DIFF WBC: CPT | Performed by: PHYSICIAN ASSISTANT

## 2018-06-28 PROCEDURE — 25000125 ZZHC RX 250: Performed by: ANESTHESIOLOGY

## 2018-06-28 PROCEDURE — 25000566 ZZH SEVOFLURANE, EA 15 MIN: Performed by: ORTHOPAEDIC SURGERY

## 2018-06-28 PROCEDURE — 36415 COLL VENOUS BLD VENIPUNCTURE: CPT | Performed by: PHYSICIAN ASSISTANT

## 2018-06-28 PROCEDURE — 37000008 ZZH ANESTHESIA TECHNICAL FEE, 1ST 30 MIN: Performed by: ORTHOPAEDIC SURGERY

## 2018-06-28 PROCEDURE — 27210794 ZZH OR GENERAL SUPPLY STERILE: Performed by: ORTHOPAEDIC SURGERY

## 2018-06-28 PROCEDURE — 0J9J0ZZ DRAINAGE OF RIGHT HAND SUBCUTANEOUS TISSUE AND FASCIA, OPEN APPROACH: ICD-10-PCS | Performed by: ORTHOPAEDIC SURGERY

## 2018-06-28 PROCEDURE — 87077 CULTURE AEROBIC IDENTIFY: CPT | Performed by: ORTHOPAEDIC SURGERY

## 2018-06-28 PROCEDURE — 99231 SBSQ HOSP IP/OBS SF/LOW 25: CPT | Performed by: INTERNAL MEDICINE

## 2018-06-28 PROCEDURE — 12000007 ZZH R&B INTERMEDIATE

## 2018-06-28 PROCEDURE — 25000128 H RX IP 250 OP 636: Performed by: NURSE ANESTHETIST, CERTIFIED REGISTERED

## 2018-06-28 PROCEDURE — 25000132 ZZH RX MED GY IP 250 OP 250 PS 637: Performed by: PHYSICIAN ASSISTANT

## 2018-06-28 PROCEDURE — 27210995 ZZH RX 272: Performed by: ORTHOPAEDIC SURGERY

## 2018-06-28 PROCEDURE — 87070 CULTURE OTHR SPECIMN AEROBIC: CPT | Performed by: ORTHOPAEDIC SURGERY

## 2018-06-28 PROCEDURE — 40000306 ZZH STATISTIC PRE PROC ASSESS II: Performed by: ORTHOPAEDIC SURGERY

## 2018-06-28 PROCEDURE — 36000056 ZZH SURGERY LEVEL 3 1ST 30 MIN: Performed by: ORTHOPAEDIC SURGERY

## 2018-06-28 PROCEDURE — 71000012 ZZH RECOVERY PHASE 1 LEVEL 1 FIRST HR: Performed by: ORTHOPAEDIC SURGERY

## 2018-06-28 RX ORDER — ONDANSETRON 2 MG/ML
INJECTION INTRAMUSCULAR; INTRAVENOUS PRN
Status: DISCONTINUED | OUTPATIENT
Start: 2018-06-28 | End: 2018-06-28

## 2018-06-28 RX ORDER — SODIUM CHLORIDE, SODIUM LACTATE, POTASSIUM CHLORIDE, CALCIUM CHLORIDE 600; 310; 30; 20 MG/100ML; MG/100ML; MG/100ML; MG/100ML
INJECTION, SOLUTION INTRAVENOUS CONTINUOUS
Status: DISCONTINUED | OUTPATIENT
Start: 2018-06-28 | End: 2018-06-28 | Stop reason: HOSPADM

## 2018-06-28 RX ORDER — ONDANSETRON 4 MG/1
4 TABLET, ORALLY DISINTEGRATING ORAL EVERY 30 MIN PRN
Status: DISCONTINUED | OUTPATIENT
Start: 2018-06-28 | End: 2018-06-28 | Stop reason: HOSPADM

## 2018-06-28 RX ORDER — MAGNESIUM HYDROXIDE 1200 MG/15ML
LIQUID ORAL PRN
Status: DISCONTINUED | OUTPATIENT
Start: 2018-06-28 | End: 2018-06-28 | Stop reason: HOSPADM

## 2018-06-28 RX ORDER — HYDROMORPHONE HYDROCHLORIDE 1 MG/ML
.3-.5 INJECTION, SOLUTION INTRAMUSCULAR; INTRAVENOUS; SUBCUTANEOUS EVERY 10 MIN PRN
Status: DISCONTINUED | OUTPATIENT
Start: 2018-06-28 | End: 2018-06-28 | Stop reason: HOSPADM

## 2018-06-28 RX ORDER — MEPERIDINE HYDROCHLORIDE 50 MG/ML
12.5 INJECTION INTRAMUSCULAR; INTRAVENOUS; SUBCUTANEOUS
Status: DISCONTINUED | OUTPATIENT
Start: 2018-06-28 | End: 2018-06-28 | Stop reason: HOSPADM

## 2018-06-28 RX ORDER — NALOXONE HYDROCHLORIDE 0.4 MG/ML
.1-.4 INJECTION, SOLUTION INTRAMUSCULAR; INTRAVENOUS; SUBCUTANEOUS
Status: DISCONTINUED | OUTPATIENT
Start: 2018-06-28 | End: 2018-06-28 | Stop reason: HOSPADM

## 2018-06-28 RX ORDER — FENTANYL CITRATE 50 UG/ML
25-50 INJECTION, SOLUTION INTRAMUSCULAR; INTRAVENOUS EVERY 5 MIN PRN
Status: DISCONTINUED | OUTPATIENT
Start: 2018-06-28 | End: 2018-06-28 | Stop reason: HOSPADM

## 2018-06-28 RX ORDER — ONDANSETRON 2 MG/ML
4 INJECTION INTRAMUSCULAR; INTRAVENOUS EVERY 30 MIN PRN
Status: DISCONTINUED | OUTPATIENT
Start: 2018-06-28 | End: 2018-06-28 | Stop reason: HOSPADM

## 2018-06-28 RX ORDER — PROPOFOL 10 MG/ML
INJECTION, EMULSION INTRAVENOUS PRN
Status: DISCONTINUED | OUTPATIENT
Start: 2018-06-28 | End: 2018-06-28

## 2018-06-28 RX ORDER — CLINDAMYCIN PHOSPHATE 900 MG/50ML
900 INJECTION, SOLUTION INTRAVENOUS SEE ADMIN INSTRUCTIONS
Status: DISCONTINUED | OUTPATIENT
Start: 2018-06-28 | End: 2018-06-28 | Stop reason: HOSPADM

## 2018-06-28 RX ORDER — KETOROLAC TROMETHAMINE 30 MG/ML
30 INJECTION, SOLUTION INTRAMUSCULAR; INTRAVENOUS EVERY 6 HOURS PRN
Status: DISCONTINUED | OUTPATIENT
Start: 2018-06-28 | End: 2018-06-28 | Stop reason: HOSPADM

## 2018-06-28 RX ORDER — LIDOCAINE 40 MG/G
CREAM TOPICAL
Status: DISCONTINUED | OUTPATIENT
Start: 2018-06-28 | End: 2018-06-28 | Stop reason: HOSPADM

## 2018-06-28 RX ORDER — ALBUTEROL SULFATE 0.83 MG/ML
2.5 SOLUTION RESPIRATORY (INHALATION) EVERY 4 HOURS PRN
Status: DISCONTINUED | OUTPATIENT
Start: 2018-06-28 | End: 2018-06-28 | Stop reason: HOSPADM

## 2018-06-28 RX ORDER — OXYCODONE HYDROCHLORIDE 5 MG/1
5 TABLET ORAL EVERY 4 HOURS PRN
Status: DISCONTINUED | OUTPATIENT
Start: 2018-06-28 | End: 2018-06-30 | Stop reason: HOSPADM

## 2018-06-28 RX ORDER — FENTANYL CITRATE 50 UG/ML
INJECTION, SOLUTION INTRAMUSCULAR; INTRAVENOUS PRN
Status: DISCONTINUED | OUTPATIENT
Start: 2018-06-28 | End: 2018-06-28

## 2018-06-28 RX ORDER — METOPROLOL TARTRATE 1 MG/ML
1-2 INJECTION, SOLUTION INTRAVENOUS EVERY 5 MIN PRN
Status: DISCONTINUED | OUTPATIENT
Start: 2018-06-28 | End: 2018-06-28 | Stop reason: HOSPADM

## 2018-06-28 RX ORDER — FENTANYL CITRATE 50 UG/ML
25-50 INJECTION, SOLUTION INTRAMUSCULAR; INTRAVENOUS
Status: DISCONTINUED | OUTPATIENT
Start: 2018-06-28 | End: 2018-06-28 | Stop reason: HOSPADM

## 2018-06-28 RX ORDER — CLINDAMYCIN PHOSPHATE 900 MG/50ML
900 INJECTION, SOLUTION INTRAVENOUS
Status: DISCONTINUED | OUTPATIENT
Start: 2018-06-28 | End: 2018-06-28 | Stop reason: HOSPADM

## 2018-06-28 RX ORDER — HYDRALAZINE HYDROCHLORIDE 20 MG/ML
2.5-5 INJECTION INTRAMUSCULAR; INTRAVENOUS EVERY 10 MIN PRN
Status: DISCONTINUED | OUTPATIENT
Start: 2018-06-28 | End: 2018-06-28 | Stop reason: HOSPADM

## 2018-06-28 RX ADMIN — ACETAMINOPHEN 975 MG: 325 TABLET, FILM COATED ORAL at 22:43

## 2018-06-28 RX ADMIN — PROPOFOL 200 MG: 10 INJECTION, EMULSION INTRAVENOUS at 11:15

## 2018-06-28 RX ADMIN — MIDAZOLAM 2 MG: 1 INJECTION INTRAMUSCULAR; INTRAVENOUS at 11:08

## 2018-06-28 RX ADMIN — SODIUM CHLORIDE, POTASSIUM CHLORIDE, SODIUM LACTATE AND CALCIUM CHLORIDE: 600; 310; 30; 20 INJECTION, SOLUTION INTRAVENOUS at 11:08

## 2018-06-28 RX ADMIN — CEFAZOLIN SODIUM 1 G: 1 INJECTION, SOLUTION INTRAVENOUS at 09:05

## 2018-06-28 RX ADMIN — SODIUM CHLORIDE: 9 INJECTION, SOLUTION INTRAVENOUS at 01:18

## 2018-06-28 RX ADMIN — SODIUM CHLORIDE, POTASSIUM CHLORIDE, SODIUM LACTATE AND CALCIUM CHLORIDE: 600; 310; 30; 20 INJECTION, SOLUTION INTRAVENOUS at 11:29

## 2018-06-28 RX ADMIN — CEFAZOLIN SODIUM 1 G: 1 INJECTION, SOLUTION INTRAVENOUS at 01:18

## 2018-06-28 RX ADMIN — FENTANYL CITRATE 50 MCG: 50 INJECTION INTRAMUSCULAR; INTRAVENOUS at 12:02

## 2018-06-28 RX ADMIN — FENTANYL CITRATE 50 MCG: 50 INJECTION INTRAMUSCULAR; INTRAVENOUS at 12:40

## 2018-06-28 RX ADMIN — CEFAZOLIN SODIUM 1 G: 1 INJECTION, SOLUTION INTRAVENOUS at 17:29

## 2018-06-28 RX ADMIN — FENTANYL CITRATE 50 MCG: 50 INJECTION INTRAMUSCULAR; INTRAVENOUS at 12:22

## 2018-06-28 RX ADMIN — LIDOCAINE HYDROCHLORIDE 50 MG: 10 INJECTION, SOLUTION EPIDURAL; INFILTRATION; INTRACAUDAL; PERINEURAL at 11:15

## 2018-06-28 RX ADMIN — ONDANSETRON 4 MG: 2 INJECTION INTRAMUSCULAR; INTRAVENOUS at 11:34

## 2018-06-28 RX ADMIN — KETOROLAC TROMETHAMINE 30 MG: 30 INJECTION, SOLUTION INTRAMUSCULAR at 12:03

## 2018-06-28 RX ADMIN — FENTANYL CITRATE 100 MCG: 50 INJECTION, SOLUTION INTRAMUSCULAR; INTRAVENOUS at 11:15

## 2018-06-28 RX ADMIN — ACETAMINOPHEN 975 MG: 325 TABLET, FILM COATED ORAL at 16:23

## 2018-06-28 ASSESSMENT — ACTIVITIES OF DAILY LIVING (ADL)
ADLS_ACUITY_SCORE: 9

## 2018-06-28 ASSESSMENT — PAIN DESCRIPTION - DESCRIPTORS: DESCRIPTORS: POUNDING;SHARP;SHOOTING

## 2018-06-28 NOTE — ANESTHESIA PREPROCEDURE EVALUATION
Anesthesia Evaluation     .             ROS/MED HX    ENT/Pulmonary:  - neg pulmonary ROS     Neurologic:       Cardiovascular:     (+) hypertension----. : . . . :. .       METS/Exercise Tolerance:     Hematologic:         Musculoskeletal:         GI/Hepatic:         Renal/Genitourinary:         Endo:         Psychiatric:         Infectious Disease:   (+) Other Infectious Disease (right hand cellulitis) right hand cellulitis, septic      Malignancy:         Other:                     Physical Exam      Airway   Mallampati: II  TM distance: >3 FB  Neck ROM: full    Dental     Cardiovascular   Rhythm and rate: regular and normal      Pulmonary    breath sounds clear to auscultation                    Anesthesia Plan      History & Physical Review  History and physical reviewed and following examination; no interval change.    ASA Status:  3 .    NPO Status:  > 8 hours    Plan for General and LMA with Intravenous and Propofol induction. Maintenance will be Balanced.    PONV prophylaxis:  Ondansetron (or other 5HT-3)       Postoperative Care  Postoperative pain management:  IV analgesics, Oral pain medications and Multi-modal analgesia.      Consents  Anesthetic plan, risks, benefits and alternatives discussed with:  Patient..                          .

## 2018-06-28 NOTE — PLAN OF CARE
Problem: Patient Care Overview  Goal: Plan of Care/Patient Progress Review  Outcome: Improving  AO, VSS. Pain managed by PRN medication and ice. Dressing CDI, CMS.

## 2018-06-28 NOTE — PROGRESS NOTES
"               [facility]  Hospitalist Progress Note  Name: Abdon Wang    MRN: [id number]  YOB: 1980    Age: 38 year old  Date of admission: 6/27/2018  Primary care provider: No Ref-Primary, Physician      Reason for Stay (Diagnosis): Right hand cellulitis with abscess         Assessment and Plan:      Summary of Stay:  Abdon Wang is a 38 year old right-handed male (who works as a ) without any significant PMH who presents with right hand cellulitis after an injury 10 days ago. He presents with significant edema and erythema over the dorsum of his right hand along with leukocytosis of 16,000 and low-grade fevers.  Patient was placed on IV Ancef.  Orthopedic hand consultation greatly appreciated and patient underwent incision and drainage along with packing of right hand abscess.    Problem List/Plan:  1. Right hand abscess with associated cellulitis: This occurred after a laceration of the dorsum of his hand when it was caught between a low seated in a door.  Orthopedic hand consultation greatly appreciated.  Postop day #0 intraoperative I&D.  Doing well otherwise.  Wound care management as outlined by orthopedics.  Otherwise, continue IV Ancef.  May transition to p.o. Keflex at discharge.  Wound cultures are growing group B strep along with Staphylococcus aureus.  Await final sensitivities.      DVT Prophylaxis: Low Risk/Ambulatory with no VTE prophylaxis indicated  Code Status: Full Code  Discharge Dispo: Home  Estimated Disch Date / # of Days until Disch: Tomorrow if okay with orthopedics        Interval History (Subjective):      Seen postop.  Doing well and denies any fevers or chills.  Right-hand pain is controlled.         Physical Exam:      Vital signs:  Temp: 98.8  F (37.1  C) Temp src: Oral BP: 136/71 Pulse: 79 Heart Rate: 75 Resp: 16 SpO2: 98 % O2 Device: None (Room air)   Height: 180.3 cm (5' 11\") Weight: 78.2 kg (172 lb 4.8 oz)  Estimated body mass " "index is 24.03 kg/(m^2) as calculated from the following:    Height as of this encounter: 1.803 m (5' 11\").    Weight as of this encounter: 78.2 kg (172 lb 4.8 oz).    I/O last 3 completed shifts:  In: 2898 [P.O.:480; I.V.:2418]  Out: -   Vitals:    06/27/18 0902 06/27/18 1313   Weight: 77.6 kg (171 lb) 78.2 kg (172 lb 4.8 oz)       Constitutional: Awake, alert, cooperative, no apparent distress               Skin: No rashes, no cyanosis, dry to touch   Neuro: CN 2-12 intact, no localizing weakness   Extremities: No edema, right hand and wrist is currently dressed and Ace wrap.  CMS is otherwise intact.  Limited extension especially of the third and fourth finger secondary to swelling and discomfort.   HEENT Normocephalic, atraumatic, normal nasal turbinates; oropharynx clear   Neck Supple; nl inspection; trachea midline; no thryomegaly   Psychiatric: A+O x3. Normal affect          Medications:      All current medications were reviewed with changes reflected in problem list.         Data:      All new lab and imaging data was reviewed.   Labs:    Recent Labs  Lab 06/28/18  1132 06/27/18  0959 06/27/18  0950 06/27/18  0911   CULT PENDING  PENDING No growth after 1 day Heavy growthBeta hemolytic Streptococcus group ASusceptibility testing not routinely done*  Heavy growthStaphylococcus speciesSpeciation in progress*  Culture in progress No growth after 1 day       Recent Labs  Lab 06/28/18  0729 06/27/18  0911   WBC 13.0* 16.4*   HGB 14.4 15.5   HCT 43.1 46.3   MCV 93 90    423       Recent Labs  Lab 06/27/18  0911      POTASSIUM 3.8   CHLORIDE 105   CO2 27   ANIONGAP 5   *   BUN 16   CR 0.78   GFRESTIMATED >90   GFRESTBLACK >90   MCKENZIE 8.9      Imaging:   Recent Results (from the past 48 hour(s))   XR Hand Right G/E 3 Views    Narrative    XR HAND RT G/E 3 VW 6/27/2018 11:04 AM    HISTORY: laceration, cellulitis, r/o fx, emphysema, etc.;       Impression    IMPRESSION: Old fracture deformity of " the fifth metacarpal. Exam  otherwise negative.    MD Mt DENNIS [phone]

## 2018-06-28 NOTE — OP NOTE
Procedure Date: 06/28/2018      DATE OF PROCEDURE:  06/28/2018      PREOPERATIVE DIAGNOSIS:  Right hand abscess.      POSTOPERATIVE DIAGNOSIS:  Right hand abscess.      PROCEDURE:  Irrigation and debridement and incision and drainage of right hand abscess.      SURGEON:  Maxim Franks MD      ANESTHESIA:  General.      COMPLICATIONS:  None.        TOURNIQUET TIME:  8 minutes.      INDICATIONS:  The patient is a 38-year-old, right-hand-dominant male who approximately 10 days ago was moving a couch when he pinned his hand between the couch and the door frame.  He suffered a small laceration over the dorsum of his right hand.  He was doing fine and covering it with just a Band-Aid and cleaning it with peroxide.  However, over the last 2 days, he developed significant erythema, swelling and pain in his right hand that was spreading.  He presented to the ER and was admitted to the hospitalist and started on IV antibiotics.  Orthopedics was consulted for evaluation.  Upon evaluation, the patient did have a weeping wound with some clear serous drainage, and after discussing the risks and benefits of different treatment options including managing it nonoperatively with antibiotics versus going in and doing an I and D, we decided that the best and safest way would be incision and drainage of his right hand.  He agreed.      DESCRIPTION OF PROCEDURE:  On the date of the procedure, the patient was met in the preoperative area by the surgeon and anesthesia team.  His right hand was marked by the operative surgeon, and informed consent was obtained.  Discussed the risks of surgery including the risk of bleeding, infection, damage to surrounding structures, risk of recurrence, risk of anesthesia, and he agreed to proceed.  We then brought him to the operating room, placed him on the operating room table in supine position, and general anesthesia was administered.  His right hand was then prepped and draped in the usual sterile  fashion.  Prior to that, a tourniquet was placed preop.  He was already getting antibiotics, so no new antibiotics were given, and a preoperative timeout was performed.  After the timeout, the hand was elevated, and the tourniquet was inflated to 250 mmHg.  We then made a 2 cm incision directly over his old weeping wound, approximately 1 cm on both sides.  Then, a hemostat was used to spread around subcutaneously to search for any abscess pockets.  Although no jasson purulence was discovered, there was a good amount of serous, soapy-appearing fluid that was debrided.  The wound was then copiously irrigated with saline and packed with quarter-inch Iodoform.  We then placed a sterile dressing over the wound and wrapped him up with cast padding and Ace wrap.  The tourniquet was deflated, and the patient was awakened from anesthesia in stable condition.        Postoperatively, he will perform warm hand soaks approximately 3 times a day for 20-30 minutes and continue IV antibiotics.  He will remove his packing starting on postop day #1 sequentially over the next couple of days.         HILARIO GUY MD             D: 2018   T: 2018   MT: HUNTER      Name:     PETTY MISHRA   MRN:      [id number]        Account:        [id number]   :      1980           Procedure Date: 2018      Document: [id number]

## 2018-06-28 NOTE — PLAN OF CARE
Problem: Pain, Acute (Adult)  Goal: Identify Related Risk Factors and Signs and Symptoms  Related risk factors and signs and symptoms are identified upon initiation of Human Response Clinical Practice Guideline (CPG).  Outcome: No Change  VSS, A&Ox4 and independent in room.  Pt redness within marked borders.  Pt denies pain, nausea, numbness.  Pt states that hand feeling better after first abx dose.  Wife present at bedside.  Calling appropriately to make needs known.  Continue POC.

## 2018-06-28 NOTE — ANESTHESIA POSTPROCEDURE EVALUATION
Patient: Abdon Wang    Procedure(s):  COMBINED IRRIGATION AND DEBRIDEMENT Right Hand - Wound Class: IV-Dirty or Infected    Diagnosis:wound  Diagnosis Additional Information: PREOPERATIVE DIAGNOSIS:  Right hand abscess.       POSTOPERATIVE DIAGNOSIS:  Right hand abscess.       PROCEDURE:  Irrigation and debridement and incision and drainage of right hand abscess.         Anesthesia Type:  General, LMA    Note:  Anesthesia Post Evaluation    Patient location during evaluation: PACU  Patient participation: Able to fully participate in evaluation  Level of consciousness: awake  Pain management: adequate  Airway patency: patent  Cardiovascular status: acceptable  Respiratory status: acceptable  Hydration status: acceptable  PONV: controlled     Anesthetic complications: None          Last vitals:  Vitals:    06/28/18 0746 06/28/18 1000 06/28/18 1230   BP: 138/66 146/86 136/81   Pulse: 79     Resp: 16 18 15   Temp: 98.4  F (36.9  C) 99.3  F (37.4  C)    SpO2: 98% 100% 98%         Electronically Signed By: Bob Garza MD  June 28, 2018  12:40 PM

## 2018-06-28 NOTE — PLAN OF CARE
Problem: Patient Care Overview  Goal: Plan of Care/Patient Progress Review  Outcome: No Change  VSS. Cellulitis to R) hand marked. Soaked in warm soapy water x1. On Ancef q 8. NS at 100 mL. Minimal pain - on scheduled tylenol. CHG wipes and shampoo cap completed. Plan: NPO at midnight. Ortho to reevaluate for I&D tomorrow.

## 2018-06-28 NOTE — ANESTHESIA CARE TRANSFER NOTE
Patient: Abdon Wang    Procedure(s):  COMBINED IRRIGATION AND DEBRIDEMENT Right Hand - Wound Class: IV-Dirty or Infected    Diagnosis: wound  Diagnosis Additional Information: No value filed.    Anesthesia Type:   General, LMA     Note:  Airway :Face Mask  Patient transferred to:PACU  Comments: Spont Resps. Follows commands. LMA removed. Maintains Resps. Tx to PACU. Report to RNHandoff Report: Identifed the Patient, Identified the Reponsible Provider, Reviewed the pertinent medical history, Discussed the surgical course, Reviewed Intra-OP anesthesia mangement and issues during anesthesia, Set expectations for post-procedure period and Allowed opportunity for questions and acknowledgement of understanding      Vitals: (Last set prior to Anesthesia Care Transfer)    CRNA VITALS  6/28/2018 1111 - 6/28/2018 1146      6/28/2018             Pulse: 65    SpO2: 100 %                Electronically Signed By: EDITH Faith CRNA  June 28, 2018  11:46 AM

## 2018-06-29 PROCEDURE — 25000128 H RX IP 250 OP 636: Performed by: PHYSICIAN ASSISTANT

## 2018-06-29 PROCEDURE — 12000007 ZZH R&B INTERMEDIATE

## 2018-06-29 PROCEDURE — 25000132 ZZH RX MED GY IP 250 OP 250 PS 637: Performed by: PHYSICIAN ASSISTANT

## 2018-06-29 PROCEDURE — 99231 SBSQ HOSP IP/OBS SF/LOW 25: CPT | Performed by: INTERNAL MEDICINE

## 2018-06-29 PROCEDURE — 99207 ZZC CDG-MDM COMPONENT: MEETS LOW - DOWN CODED: CPT | Performed by: INTERNAL MEDICINE

## 2018-06-29 RX ADMIN — IBUPROFEN 600 MG: 600 TABLET ORAL at 02:08

## 2018-06-29 RX ADMIN — ACETAMINOPHEN 975 MG: 325 TABLET, FILM COATED ORAL at 07:47

## 2018-06-29 RX ADMIN — CEFAZOLIN SODIUM 1 G: 1 INJECTION, SOLUTION INTRAVENOUS at 17:59

## 2018-06-29 RX ADMIN — CEFAZOLIN SODIUM 1 G: 1 INJECTION, SOLUTION INTRAVENOUS at 02:05

## 2018-06-29 RX ADMIN — ACETAMINOPHEN 975 MG: 325 TABLET, FILM COATED ORAL at 21:22

## 2018-06-29 RX ADMIN — ACETAMINOPHEN 975 MG: 325 TABLET, FILM COATED ORAL at 16:41

## 2018-06-29 RX ADMIN — CEFAZOLIN SODIUM 1 G: 1 INJECTION, SOLUTION INTRAVENOUS at 09:52

## 2018-06-29 ASSESSMENT — ACTIVITIES OF DAILY LIVING (ADL)
ADLS_ACUITY_SCORE: 9

## 2018-06-29 NOTE — PROGRESS NOTES
"Orthopedic Surgery Progress Note  6/29/2018  POD #1 S/P Irrigation and debridement and incision and drainage of right hand abscess    S: Patient voices no unexpected ortho complaints today. Denies chest pain, shortness of breath, fever/chills.  Has some minor pain in right hand that is controlled with tylenol and ibuprofen. Reports decreased erythema and slightly improved right hand ROM post-surgery. Packing removed today- continue TID warm soapy water soaks. Ok to d/c home from Ortho standpoint when final sensitivities and antibiotic plan in place per Hospitalist.     O: Blood pressure 134/71, pulse 79, temperature 97.1  F (36.2  C), temperature source Oral, resp. rate 16, height 1.803 m (5' 11\"), weight 78.2 kg (172 lb 4.8 oz), SpO2 98 %.  Lab Results   Component Value Date    HGB 14.4 06/28/2018     No results found for: INR  I/O last 3 completed shifts:  In: 1600 [I.V.:1600]  Out: -      Decreasing overall erythema to dorsal hand and wrist  Open incision to dorsum of right hand- moderate drainage, packing removed  Distal extremity CMSI in m/r/u distributions  Some decreased ROM primarily with flexion and extension of 3rd and 4th digits but improving  2+ radial pulse, cap refill < 3 sec     A: Mr. Wang is doing well status post Procedure(s):  S/P Irrigation and debridement and incision and drainage of right hand abscess    P:   - Pain Control: controlled per patient with Ibuprofen and Tylenol- has at home doesn't need prescription   - Weight Bearing Status: Do gentle finger ROM exercises several times each day to decrease stiffness, avoid and strenuous activity or lifting with right hand   - Wound Care: Continue warm soapy water soaks to right hand for 20 minutes 3x per day as wound heals over next 1-2 weeks. Apply slightly moistened gauze over wound and then cover with dry gauze, kerlix and ACE wrap.   - Disp: Ok to d/c from Ortho standpoint later today vs tomorrow when final abx plan in place per " Hospitalist   - Follow Up: Please make a follow up appointment with your Orthopedic Surgeon, Dr. Maxim Franks, for the week of July 16, 2018 if possible. Call Dr. Franks's care coordinator, Petra, at [phone] to schedule this appointment.        Please page me with any Ortho related questions/concerns on this patient between 7a-5pm today.     Philippe Wiseman, MSN, APRN, NP-C   Nurse Practitioner, [facility]  [facility]   Office Phone: [phone]  Pager: [phone]

## 2018-06-29 NOTE — PROGRESS NOTES
"  [facility]  Hospitalist Progress Note  Zurdo Fox MD 06/29/2018    Reason for Stay (Diagnosis): R hand abscess/cellulitis         Assessment and Plan:      Summary of Stay: Abdon Wang is a 38 year old right-handed male (who works as a ) without any significant PMH who presents with right hand cellulitis after an injury 10 days ago. He presents with significant edema and erythema over the dorsum of his right hand along with leukocytosis of 16,000 and low-grade fevers.  Patient was placed on IV Ancef.  Orthopedic hand consultation greatly appreciated and patient underwent incision and drainage along with packing of right hand abscess.  Await operative culture results     Problem List/Plan:  1. Right hand abscess with associated cellulitis: This occurred after a laceration of the dorsum of his hand when it was caught between a low seated in a door.  Orthopedic hand consultation greatly appreciated.  Postop day #1 intraoperative I&D.  Doing well otherwise.  Wound care management as outlined by orthopedics.  Otherwise, continue IV Ancef.  Wound cultures are growing group B strep along with Staphylococcus aureus.  Await final sensitivities which will determine discharge antibiotic.  I d/w NP from ortho today        DVT Prophylaxis: Low Risk/Ambulatory with no VTE prophylaxis indicated  Code Status: Full Code  Discharge Dispo: Home  1. Estimated Disch Date / # of Days until Disch: Today vs tomorrow when sensitivities back on cx results            Interval History (Subjective):      Feels well.  No complaints.                    Physical Exam:      Last Vital Signs:  /71 (BP Location: Left arm)  Pulse 79  Temp 97.1  F (36.2  C) (Oral)  Resp 16  Ht 1.803 m (5' 11\")  Wt 78.2 kg (172 lb 4.8 oz)  SpO2 98%  BMI 24.03 kg/m2      Intake/Output Summary (Last 24 hours) at 06/29/18 1230  Last data filed at 06/29/18 1145   Gross per 24 hour   Intake              920 ml   Output  "               0 ml   Net              920 ml       Constitutional: Awake, alert, cooperative, no apparent distress   Respiratory: Clear to auscultation bilaterally, no crackles or wheezing   Cardiovascular: Regular rate and rhythm, normal S1 and S2, and no murmur noted   Abdomen: Normal bowel sounds, soft, non-distended, non-tender   Skin: No rashes, no cyanosis, dry to touch.  Redness receced from previous outline of UE   Neuro: Alert and oriented x3, no weakness, numbness, memory loss   Extremities: No edema, normal range of motion   Other(s): R hand wrapped in ACE wrap - was changed by ortho NP earlier this AM.  I left dressing in place       All other systems: Negative          Medications:      All current medications were reviewed with changes reflected in problem list.         Data:      All new lab and imaging data was reviewed.   Labs:    Recent Labs  Lab 06/28/18  0729 06/27/18  0911   WBC 13.0* 16.4*   HGB 14.4 15.5   HCT 43.1 46.3   MCV 93 90    423      Imaging:   No results found for this or any previous visit (from the past 24 hour(s)).

## 2018-06-29 NOTE — PLAN OF CARE
"Problem: Patient Care Overview  Goal: Plan of Care/Patient Progress Review  /76 (BP Location: Left arm)  Pulse 79  Temp 98.8  F (37.1  C) (Oral)  Resp 16  Ht 1.803 m (5' 11\")  Wt 78.2 kg (172 lb 4.8 oz)  SpO2 98%  BMI 24.03 kg/m2  Neuro: A&Ox4  Pain: c/o minimal pain in hand, controlled with scheduled tylenol  Resp: LS clear, On RA, no SOB  Cardiac: WDL  GI/:WDL  Diet: tolerating regular diet, good intake  Skin/mobility: incision to right hand, packing removed by PA, edges approximated, wet to dry dressing changed BID, trace edema to right hand  Tx/plan: IV ancef, BID dressing change, awaiting sensitivities for PO ABX  Will continue to monitor and provide supportive care.          "

## 2018-06-29 NOTE — PLAN OF CARE
Problem: Patient Care Overview  Goal: Plan of Care/Patient Progress Review  Outcome: Improving  VSS continues to have right hand pain on I&D site managed with scheduled tylenol and PRN motrin. Alert/oriented ambulates independent on IV ancef with at bedside. Pt looking forward to D/C.

## 2018-06-30 VITALS
TEMPERATURE: 97.9 F | SYSTOLIC BLOOD PRESSURE: 135 MMHG | BODY MASS INDEX: 24.12 KG/M2 | RESPIRATION RATE: 16 BRPM | HEART RATE: 65 BPM | DIASTOLIC BLOOD PRESSURE: 72 MMHG | HEIGHT: 71 IN | OXYGEN SATURATION: 98 % | WEIGHT: 172.3 LBS

## 2018-06-30 LAB
BACTERIA SPEC CULT: ABNORMAL
BACTERIA SPEC CULT: ABNORMAL
Lab: ABNORMAL
SPECIMEN SOURCE: ABNORMAL

## 2018-06-30 PROCEDURE — 99239 HOSP IP/OBS DSCHRG MGMT >30: CPT | Performed by: INTERNAL MEDICINE

## 2018-06-30 PROCEDURE — 25000132 ZZH RX MED GY IP 250 OP 250 PS 637: Performed by: PHYSICIAN ASSISTANT

## 2018-06-30 PROCEDURE — 25000128 H RX IP 250 OP 636: Performed by: PHYSICIAN ASSISTANT

## 2018-06-30 RX ORDER — CEPHALEXIN 500 MG/1
500 CAPSULE ORAL 3 TIMES DAILY
Qty: 30 CAPSULE | Refills: 0 | Status: SHIPPED | OUTPATIENT
Start: 2018-06-30 | End: 2018-07-10

## 2018-06-30 RX ADMIN — ACETAMINOPHEN 975 MG: 325 TABLET, FILM COATED ORAL at 08:14

## 2018-06-30 RX ADMIN — CEFAZOLIN SODIUM 1 G: 1 INJECTION, SOLUTION INTRAVENOUS at 10:10

## 2018-06-30 RX ADMIN — CEFAZOLIN SODIUM 1 G: 1 INJECTION, SOLUTION INTRAVENOUS at 02:42

## 2018-06-30 ASSESSMENT — ACTIVITIES OF DAILY LIVING (ADL)
ADLS_ACUITY_SCORE: 9

## 2018-06-30 ASSESSMENT — PAIN DESCRIPTION - DESCRIPTORS: DESCRIPTORS: ACHING

## 2018-06-30 NOTE — PLAN OF CARE
Problem: Pain, Acute (Adult)  Goal: Identify Related Risk Factors and Signs and Symptoms  Related risk factors and signs and symptoms are identified upon initiation of Human Response Clinical Practice Guideline (CPG).   Outcome: Improving  VSS. Pt is alert and orientated, up independently. Rt hand wrapped,  fingers swollen, CMS intact. Pain controlled with tylenol. IV ancef given. Plan to d/c today. Will continue with POC.

## 2018-06-30 NOTE — PROGRESS NOTES
Feels well.  No complaints.   No fever.  Pain controlled without need for narcotic pain meds.      Wound cx from ER growing strep and staph species (oxacillin sens)  Wound cx from surgery also growing strep and staph species - though ID on staph pending, suspect will be same organism    D/c today with Keflex.  IF operative cx returns growing resistant bacteria can call in script if needed, but don't think this will be necessary

## 2018-06-30 NOTE — PROGRESS NOTES
Pt ready to dc home with family. IV dc'd. Reviewed avs, prescription efaxed to pharmacy of choice. Dressing supplies provided for future drsg changes. No further needs.

## 2018-06-30 NOTE — DISCHARGE SUMMARY
Admit Date:     06/27/2018   Discharge Date:     06/30/2018      DATE OF ADMISSION:  06/27/2018      DATE OF DISCHARGE:  06/30/2018      PRINCIPAL FINAL DIAGNOSIS:  Right hand abscess with associated cellulitis, status post incision and drainage this admission.  Wound cultures grown out streptococcus and Staphylococcus species.  He is being discharged on oral Keflex for another 10 days.      PRINCIPAL PROCEDURES THIS ADMISSION:   1.  Hand x-ray showing old fracture of the fifth metacarpal, otherwise negative.   2.  Wound culture from the Emergency Department on presentation growing out beta hemolytic streptococcus group A and Staphylococcus pseudintermedius which is resistant to penicillin but sensitive to oxacillin.   3.  Wound culture from operative procedure on 06/20/2018, which is currently growing out beta hemolytic streptococcus group A and moderate growth of a Staphylococcus species yet to be identified.  I suspect this will likely be the same species cultured in the ER culture, but await identification sensitivity on this  admission.      REASON FOR ADMISSION:  Please see dictated history and physical.  In brief, Mr. Wang is a 38-year-old male who presented to the hospital with concerns about right hand pain and redness after an injury 10 days ago.  He was found to have leukocytosis and low-grade fever on presentation.      HOSPITAL COURSE:   1.  Right hand abscess and cellulitis:  The patient underwent incision and drainage by Orthopedic Surgery.  He responded well to surgery and antibiotic therapy.  His initial ER wound cultures are growing out streptococcus as well as a Staphylococcus species as above.  Operative cultures also grew out Streptococcus and Staphylococcus species.  Suspect this will likely be the same organism, although the operative wound cultures are pending.  Staphylococcus species from the ER culture is sensitive to oxacillin.  The patient was treated with IV Ancef while hospitalized  with good response to being discharged on oral Keflex for another 10 days to complete antibiotic course.  If his operative cultures do grow out a staph species that was resistant to Keflex, we will need to change antibiotic, although I do not think this will be the case.  He appeared stable for discharge from the hospital today.      DISCHARGE MEDICATIONS:   1.  Keflex 500 mg 3 times a day for another 10 days.   2.  Flonase 1 spray into both nostrils daily.      DISCHARGE INSTRUCTIONS:   1.  The patient was given wound care instructions on discharge.   2.  Followup appointment with the orthopedic surgeon, Dr. Franks for the week of 2018.  The patient given phone number to make this appointment.      I saw and examined the patient on day of discharge.  I spent greater than 30 minutes discharging this patient from the hospital on the day of discharge.         JODI CHU MD             D: 2018   T: 2018   MT: NACHO      Name:     PETTY MISHRA   MRN:      [id number]        Account:        [id number]   :      1980           Admit Date:     2018                                  Discharge Date: 2018      Document: [id number]

## 2018-07-01 LAB
BACTERIA SPEC CULT: ABNORMAL
BACTERIA SPEC CULT: ABNORMAL
Lab: ABNORMAL
SPECIMEN SOURCE: ABNORMAL

## 2018-07-03 LAB
BACTERIA SPEC CULT: NO GROWTH
BACTERIA SPEC CULT: NO GROWTH
Lab: NORMAL
Lab: NORMAL
SPECIMEN SOURCE: NORMAL
SPECIMEN SOURCE: NORMAL

## 2018-07-05 LAB
BACTERIA SPEC CULT: NORMAL
Lab: NORMAL
SPECIMEN SOURCE: NORMAL

## 2018-09-20 ENCOUNTER — HOSPITAL ENCOUNTER (EMERGENCY)
Facility: CLINIC | Age: 38
Discharge: HOME | End: 2018-09-20
Attending: NURSE PRACTITIONER | Admitting: NURSE PRACTITIONER
Payer: COMMERCIAL

## 2018-09-20 VITALS
DIASTOLIC BLOOD PRESSURE: 98 MMHG | SYSTOLIC BLOOD PRESSURE: 174 MMHG | TEMPERATURE: 98 F | RESPIRATION RATE: 18 BRPM | OXYGEN SATURATION: 99 % | HEART RATE: 86 BPM

## 2018-09-20 DIAGNOSIS — S61.211D LACERATION OF LEFT INDEX FINGER WITHOUT FOREIGN BODY WITHOUT DAMAGE TO NAIL, SUBSEQUENT ENCOUNTER: ICD-10-CM

## 2018-09-20 PROCEDURE — 90715 TDAP VACCINE 7 YRS/> IM: CPT | Performed by: NURSE PRACTITIONER

## 2018-09-20 PROCEDURE — 99283 EMERGENCY DEPT VISIT LOW MDM: CPT | Mod: 25

## 2018-09-20 PROCEDURE — 25000128 H RX IP 250 OP 636: Performed by: NURSE PRACTITIONER

## 2018-09-20 PROCEDURE — 90471 IMMUNIZATION ADMIN: CPT

## 2018-09-20 PROCEDURE — 12001 RPR S/N/AX/GEN/TRNK 2.5CM/<: CPT

## 2018-09-20 RX ADMIN — CLOSTRIDIUM TETANI TOXOID ANTIGEN (FORMALDEHYDE INACTIVATED), CORYNEBACTERIUM DIPHTHERIAE TOXOID ANTIGEN (FORMALDEHYDE INACTIVATED), BORDETELLA PERTUSSIS TOXOID ANTIGEN (GLUTARALDEHYDE INACTIVATED), BORDETELLA PERTUSSIS FILAMENTOUS HEMAGGLUTININ ANTIGEN (FORMALDEHYDE INACTIVATED), BORDETELLA PERTUSSIS PERTACTIN ANTIGEN, AND BORDETELLA PERTUSSIS FIMBRIAE 2/3 ANTIGEN 0.5 ML: 5; 2; 2.5; 5; 3; 5 INJECTION, SUSPENSION INTRAMUSCULAR at 16:10

## 2018-09-20 ASSESSMENT — ENCOUNTER SYMPTOMS: WOUND: 1

## 2018-09-20 NOTE — ED TRIAGE NOTES
Patient cut left pointer finger with a saw, went to  and was sent to ED for repair. Bleeding controlled at this time, pain 5/10. Unknown Tetanus status.

## 2018-09-20 NOTE — ED AVS SNAPSHOT
[facility] Emergency Department    [address]    [facility] [address]    Phone:  [phone]    Fax:  [phone]                                       Abdon Wang   MRN: [id number]    Department:  [facility] Emergency Department   Date of Visit:  9/20/2018           After Visit Summary Signature Page     I have received my discharge instructions, and my questions have been answered. I have discussed any challenges I see with this plan with the nurse or doctor.    ..........................................................................................................................................  Patient/Patient Representative Signature      ..........................................................................................................................................  Patient Representative Print Name and Relationship to Patient    ..................................................               ................................................  Date                                   Time    ..........................................................................................................................................  Reviewed by Signature/Title    ...................................................              ..............................................  Date                                               Time          22EPIC Rev 08/18

## 2018-09-20 NOTE — ED AVS SNAPSHOT
[facility] Emergency Department    [address] [facility] [address]    Phone:  [phone]    Fax:  [phone]                                       Abdon Wang   MRN: [id number]    Department:  [facility] Emergency Department   Date of Visit:  9/20/2018           Patient Information     Date Of Birth          1980        Your diagnoses for this visit were:     Laceration of left index finger without foreign body without damage to nail, subsequent encounter        You were seen by Ophelia Loo, EDITH CNP.      Follow-up Information     Follow up with [facility] In 2 days.    Specialties:  Sports Medicine, Pain Management, Obstetrics & Gynecology, Pediatrics, Internal Medicine, Nephrology    Why:  For wound re-check    Contact information:    [address]  [facility] [address] [phone]        Follow up with [facility] In 1 week.    Specialties:  Sports Medicine, Pain Management, Obstetrics & Gynecology, Pediatrics, Internal Medicine, Nephrology    Why:  For suture removal    Contact information:    [address]  [facility] [id number] [phone]        Follow up with [facility] Emergency Department.    Specialty:  EMERGENCY MEDICINE    Why:  As needed, If symptoms worsen    Contact information:    [address] [facility] [address] [phone]        Follow up with [facility].    Why:  to see Hand MD if needed with any deficit    Contact information:    [address] [place] [address]  [phone]          Discharge Instructions       Discharge Instructions  Laceration (Cut)    You were seen today for a laceration (cut).  Your provider examined your laceration for any problems such a buried foreign body (like glass, a splinter, or gravel), or injury to blood vessels,  tendons, and nerves.  Your provider may have also rinsed and/or scrubbed your laceration to help prevent an infection. It may not be possible to find all problems with your laceration on the first visit; occasionally foreign bodies or a tendon injury can go undetected.    Your laceration may have been closed in one of several ways:    No closure: many wounds will heal just fine without closure.    Stitches: regular stitches that require removal.    Staples: skin staples are often used in the scalp/head.    Wound adhesive (glue): skin glue can be used for certain lacerations and doesn t require removal.    Wound strips (aka Butterfly bandages or steri-strips): these are bandages that help to close a wound.    Absorbable stitches:  dissolving  stitches that go away on their own and usually don t require removal.    A small percentage of wounds will develop an infection regardless of how well the wound is cared for. Antibiotics are generally not indicated to prevent an infection so are only given for a small number of high-risk wounds. Some lacerations are too high risk to close, and are left open to heal because closure can increase the likelihood that an infection will develop.    Remember that all lacerations, no matter how expertly repaired, will cause scarring. We consider many factors, techniques, and materials, in our efforts to provide the best possible cosmetic outcome.    Generally, every Emergency Department visit should have a follow-up clinic visit with either a primary or a specialty clinic/provider. Please follow-up as instructed by your emergency provider today.     Return to the Emergency Department right away if:    You have more redness, swelling, pain, drainage (pus), a bad smell, or red streaking from your laceration as these symptoms could indicate an infection.    You have a fever of 100.4 F or more.    You have bleeding that you cannot stop at home. If your cut starts to bleed, hold pressure on  the bleeding area with a clean cloth or put pressure over the bandage.  If the bleeding does not stop after using constant pressure for 30 minutes, you should return to the Emergency Department for further treatment.    An area past the laceration is cool, pale, or blue compared with the other side, or has a slower return of color when squeezed.    Your dressing seems too tight or starts to get uncomfortable or painful. For children, signs of a problem might be irritability or restlessness.    You have loss of normal function or use of an area, such as being unable to straighten or bend a finger normally.    You have a numb area past the laceration.    Return to the Emergency Department or see your regular provider if:    The laceration starts to come open.     You have something coming out of the cut or a feeling that there is something in the laceration.    Your wound will not heal, or keeps breaking open. There can always be glass, wood, dirt or other things in any wound.  They will not always show up, even on x-rays.  If a wound does not heal, this may be why, and it is important to follow-up with your regular provider.    Home Care:    Take your dressing off in 12-24 hours, or as instructed by your provider, to check your laceration. Remove the dressing sooner if it seems too tight or painful, or if it is getting numb, tingly, or pale past the dressing.    Gently wash your laceration 1-2 times daily with clean water and mild soap. It is okay to shower or run clean water over the laceration, but do not let the laceration soak in water (no swimming).    If your laceration was closed with wound adhesive or strips: pat it dry and leave it open to the air. For all other repairs: after you wash your laceration, or at least 2 times a day, apply antibiotic ointment (such as Neosporin  or Bacitracin ) to the laceration, then cover it with a Band-Aid  or gauze.    Keep the laceration clean. Wear gloves or other  protective clothing if you are around dirt.    Follow-up for removal:    If your wound was closed with staples or regular stitches, they need to be removed according to the instructions and timeline specified by your provider today.    If your wound was closed with absorbable ( dissolving ) sutures, they should fall out, dissolve, or not be visible in about one week. If they are still visible, then they should be removed according to the instructions and timeline specified by your provider today.    Scars:  To help minimize scarring:    Wear sunscreen over the healed laceration when out in the sun.    Massage the area regularly once healed.    You may apply Vitamin E to the healed wound.    Wait. Scars improve in appearance over months and years.    If you were given a prescription for medicine here today, be sure to read all of the information (including the package insert) that comes with your prescription.  This will include important information about the medicine, its side effects, and any warnings that you need to know about.  The pharmacist who fills the prescription can provide more information and answer questions you may have about the medicine.  If you have questions or concerns that the pharmacist cannot address, please call or return to the Emergency Department.       Remember that you can always come back to the Emergency Department if you are not able to see your regular provider in the amount of time listed above, if you get any new symptoms, or if there is anything that worries you.      24 Hour Appointment Hotline       To make an appointment at any [facility], call [phone] ([phone]). If you don't have a family doctor or clinic, we will help you find one. [facility] are conveniently located to serve the needs of you and your family.             Review of your medicines      Our records show that you are taking the medicines listed below. If these are incorrect, please call  your family doctor or clinic.        Dose / Directions Last dose taken    fluticasone 50 MCG/ACT spray   Commonly known as:  FLONASE   Dose:  1 spray        Spray 1 spray into both nostrils daily   Refills:  0                Orders Needing Specimen Collection     None      Pending Results     No orders found from 9/18/2018 to 9/21/2018.            Pending Culture Results     No orders found from 9/18/2018 to 9/21/2018.            Pending Results Instructions     If you had any lab results that were not finalized at the time of your Discharge, you can call the ED Lab Result RN at [phone]. You will be contacted by this team for any positive Lab results or changes in treatment. The nurses are available 7 days a week from 10A to 6:30P.  You can leave a message 24 hours per day and they will return your call.        Test Results From Your Hospital Stay               Clinical Quality Measure: Blood Pressure Screening     Your blood pressure was checked while you were in the emergency department today. The last reading we obtained was  BP: (!) 174/98 . Please read the guidelines below about what these numbers mean and what you should do about them.  If your systolic blood pressure (the top number) is less than 120 and your diastolic blood pressure (the bottom number) is less than 80, then your blood pressure is normal. There is nothing more that you need to do about it.  If your systolic blood pressure (the top number) is 120-139 or your diastolic blood pressure (the bottom number) is 80-89, your blood pressure may be higher than it should be. You should have your blood pressure rechecked within a year by a primary care provider.  If your systolic blood pressure (the top number) is 140 or greater or your diastolic blood pressure (the bottom number) is 90 or greater, you may have high blood pressure. High blood pressure is treatable, but if left untreated over time it can put you at risk for heart attack, stroke, or  "kidney failure. You should have your blood pressure rechecked by a primary care provider within the next 4 weeks.  If your provider in the emergency department today gave you specific instructions to follow-up with your doctor or provider even sooner than that, you should follow that instruction and not wait for up to 4 weeks for your follow-up visit.        Thank you for choosing [facility]       Thank you for choosing [facility] for your care. Our goal is always to provide you with excellent care. Hearing back from our patients is one way we can continue to improve our services. Please take a few minutes to complete the written survey that you may receive in the mail after you visit with us. Thank you!        [employer] Information     [facility] lets you send messages to your doctor, view your test results, renew your prescriptions, schedule appointments and more. To sign up, go to [email or web address] . Click on \"Log in\" on the left side of the screen, which will take you to the Welcome page. Then click on \"Sign up Now\" on the right side of the page.     You will be asked to enter the access code listed below, as well as some personal information. Please follow the directions to create your username and password.     Your access code is: [email or web address]  Expires: 2018 10:43 AM     Your access code will  in 90 days. If you need help or a new code, please call your [facility] clinic or [phone].        Care EveryWhere ID     This is your Care EveryWhere ID. This could be used by other organizations to access your [facility] medical records  [id number]        Equal Access to Services     CURTIS REEDER : Hadii tommie gage Soolive, waaxda luqadaha, qaybta kaalmada rip, finn horton. So [facility] [phone].    ATENCIÓN: Si habla español, tiene a lópez disposición servicios gratuitos de asistencia lingüística. Llame al [phone].    We comply with applicable federal civil rights " laws and [place] laws. We do not discriminate on the basis of race, color, national origin, age, disability, sex, sexual orientation, or gender identity.            After Visit Summary       This is your record. Keep this with you and show to your community pharmacist(s) and doctor(s) at your next visit.

## 2018-09-20 NOTE — ED PROVIDER NOTES
History     Chief Complaint:  Laceration      HPI   Abdon Wang is a 38 year old male who presents with his wife to the Emergency Department for evaluation of a hand laceration. The patient cut his left hand index while using a saw. He was sent here from urgent care. Patient notes his last Tdap was 5 years prior, however this has not been documented.     Allergies:  No Known Drug Allergies     Medications:    Flonase     Past Medical History:    Hand cellulitis     Past Surgical History:    Irrigation&debrigment hand-Right     Family History:    History reviewed. No pertinent family history.    Social History:  Marital Status:  Single   The patient was accompanied to the ED by wife.  Smoking Status: Never  Smokeless Tobacco: Never  Alcohol Use: Yes     Review of Systems   Skin: Positive for wound.   All other systems reviewed and are negative.      Physical Exam   First Vitals:  BP: (!) 174/98  Pulse: 86  Temp: 98  F (36.7  C)  Resp: 18  SpO2: 99 %    Physical Exam  Constitutional: Alert, attentive, GCS 15.    HEENT: Conjunctiva normal. Mucous membranes moist  CV: regular rate and rhythm; no murmurs, rubs or gallups. Cap refill <2seconds.  Respiratory: Effort normal. Lungs clear to auscultation bilaterally. No crackles/rubs/wheezes.  Good air movement.  MSK: left hand: 2 cm jagged, circular laceration over palmar side of distal phalynx on 2nd digit. Skin does not fully approximate. Sensation intact. Full strength with flexion and extension against rtesistance   Neurological: Alert, attentive.  Skin: Skin is warm and dry.  No rashes or petechiae.  Psychiatric: Normal affect.    Emergency Department Course     Procedures:      Laceration Repair      LACERATION:  A stellate clean 2 cm laceration.    LOCATION:  2nd digit left hand.    FUNCTION:  Distally sensation, circulation, motor and tendon function are intact.    ANESTHESIA:  Digital block using 1% lidocaine total of 2 mLs.    PREPARATION:  Irrigation and  Scrubbing with Normal Saline and Shur Clens.    DEBRIDEMENT:  no debridement and wound explored, no foreign body found.    CLOSURE:  Wound was closed with One Layer.  Skin closed with 3 x 5.0 Ethylon using interrupted sutures and steri-strips.    Interventions:  1610: Tdap 0.5mL  IM    Emergency Department Course:  Nursing notes and vitals reviewed.    1601: I performed an exam of the patient as documented above.   Findings and plan explained to the Patient and spouse. Patient discharged home with instructions regarding supportive care, medications, and reasons to return. The importance of close follow-up was reviewed.       Impression & Plan      Medical Decision Making:  Abdon Wang is a 38 year old male who presents for evaluation of a laceration to the left hand.  The wound was carefully evaluated and explored.  Due to jagged nature of the laceration and resulting fragility of skin, laceration was closed with sutures and steri-strips as noted above.  There is no evidence of muscular, tendon, or bony damage with this laceration.  No signs of foreign body.  Possible complications (infection, scarring) were reviewed with the patient.  Follow up with primary care in two days for a wound recheck and in one week for suture removal. Return to ED with increasing pain, evidence of infection, evidence of neurovascular compromise of any further concerns.     Critical Care time:  none    Diagnosis:    ICD-10-CM    1. Laceration of left index finger without foreign body without damage to nail, subsequent encounter S61.211D        Disposition:  discharged to home      Coral Boogie  9/20/2018   [facility] EMERGENCY DEPARTMENT      Scribe Disclosure:  I, Coral Boogie, am serving as a scribe at 4:01 PM on 9/20/2018 to document services personally performed by Ophelia Loo APRN  based on my observations and the provider's statements to me.       Ophelia Loo APRN CNP  09/21/18 1127

## 2021-05-06 ENCOUNTER — VIRTUAL VISIT (OUTPATIENT)
Dept: UROLOGY | Facility: CLINIC | Age: 41
End: 2021-05-06
Payer: COMMERCIAL

## 2021-05-06 ENCOUNTER — TELEPHONE (OUTPATIENT)
Dept: UROLOGY | Facility: CLINIC | Age: 41
End: 2021-05-06

## 2021-05-06 VITALS — WEIGHT: 175 LBS | HEIGHT: 71 IN | BODY MASS INDEX: 24.5 KG/M2

## 2021-05-06 DIAGNOSIS — Z30.2 ENCOUNTER FOR STERILIZATION: Primary | ICD-10-CM

## 2021-05-06 PROCEDURE — 99203 OFFICE O/P NEW LOW 30 MIN: CPT | Mod: GT | Performed by: UROLOGY

## 2021-05-06 ASSESSMENT — PAIN SCALES - GENERAL: PAINLEVEL: NO PAIN (0)

## 2021-05-06 ASSESSMENT — MIFFLIN-ST. JEOR: SCORE: 1715.92

## 2021-05-06 NOTE — TELEPHONE ENCOUNTER
----- Message from Ly Prieto sent at 5/6/2021 11:33 AM CDT -----  We are scheduling a vasectomy in the office in the near future.    BREE

## 2021-05-06 NOTE — LETTER
5/6/2021       RE: Abdon Wang  [address] [facility] [address]     Dear Colleague,    Thank you for referring your patient, Abdon Wang, to the [facility] UROLOGY [facility] at [facility]. Please see a copy of my visit note below.    *SEND LINK TO CELL PHONE*    Delbert is a 40 year old who is being evaluated via a billable video visit.      How would you like to obtain your AVS? MyChart  If the video visit is dropped, the invitation should be resent by: Text to cell phone: [phone]  Will anyone else be joining your video visit? No        VASECTOMY CONSULTATION NOTE  [facility]  [phone]  DATE OF VISIT: 5/6/2021    PATIENT NAME: Abdon Wang    YOB: 1980      REASON FOR CONSULTATION: Mr. Abdon Wang is a 40 year old year old gentleman who is being seen as a virtual visit in the urology clinic today requesting a vasectomy. He has 3 children and he wishes to have a vasectomy for birth control. He has no prior urologic history and has had no prior surgery on the testicles. He has no symptoms in the testicles.    PAST MEDICAL HISTORY: History reviewed. No pertinent past medical history.    PAST SURGICAL HISTORY:   Past Surgical History:   Procedure Laterality Date     IRRIGATION AND DEBRIDEMENT HAND, COMBINED Right 6/28/2018    Procedure: COMBINED IRRIGATION AND DEBRIDEMENT HAND;  Irrigation and debridement and incision and drainage of right hand abscess;  Surgeon: Maxim Franks MD;  Location:  OR       MEDICATIONS:   Current Outpatient Medications:      fluticasone (FLONASE) 50 MCG/ACT spray, Spray 1 spray into both nostrils daily, Disp: , Rfl:     ALLERGIES:   Allergies   Allergen Reactions     Seasonal Allergies        FAMILY HISTORY: No family history on file.    SOCIAL HISTORY:   Social History     Socioeconomic History     Marital status: Single     Spouse name: Not on file     Number of  children: Not on file     Years of education: Not on file     Highest education level: Not on file   Occupational History     Not on file   Social Needs     Financial resource strain: Not on file     Food insecurity     Worry: Not on file     Inability: Not on file     Transportation needs     Medical: Not on file     Non-medical: Not on file   Tobacco Use     Smoking status: Never Smoker     Smokeless tobacco: Never Used   Substance and Sexual Activity     Alcohol use: Yes     Drug use: No     Sexual activity: Not on file   Lifestyle     Physical activity     Days per week: Not on file     Minutes per session: Not on file     Stress: Not on file   Relationships     Social connections     Talks on phone: Not on file     Gets together: Not on file     Attends Congregational service: Not on file     Active member of club or organization: Not on file     Attends meetings of clubs or organizations: Not on file     Relationship status: Not on file     Intimate partner violence     Fear of current or ex partner: Not on file     Emotionally abused: Not on file     Physically abused: Not on file     Forced sexual activity: Not on file   Other Topics Concern     Parent/sibling w/ CABG, MI or angioplasty before 65F 55M? Not Asked   Social History Narrative     Not on file       REVIEW OF SYSTEMS:  Skin: No rash, pruritis, or skin pigmentation  Eyes: No changes in vision  Ears/Nose/Throat: No changes in hearing, no nosebleeds  Respiratory: No shortness of breath, dyspnea on exertion, cough, or hemoptysis  Cardiovascular: No chest pain or palpitations  Gastrointestinal: No diarrhea or constipation. No abdominal pain. No hematochezia  Genitourinary: see HPI  Musculoskeletal: No pain or swelling of joints, normal range of motion  Neurologic: No weakness or tremors  Psychiatric: No recent changes in memory or mood  Hematologic/Lymphatic/Immunologic: No easy bruising or enlarged lymph nodes  Endocrine: No weight gain or  "loss      HEIGHT: 5' 11\"     WEIGHT: 175 lbs 0 oz   BP: Data Unavailable    PULSE: Data Unavailable    EXAM:   General: Alert and oriented to time, place, and self. In NAD   HEENT: Head AT/NC, EOMI, CN Grossly intact   Lungs: no respiratory distress, or pursed lip breathing   Heart: No obvious jugular venous distension present   Musculoskeltal: Normal movements. Normal appearing musculature  Skin: no suspicious lesions or rashes   Neuro: Alert, oriented, speech and mentation normal; moving all 4 extremities equally.   Psych: affect and mood normal      DIAGNOSIS: Request for sterilization    PLAN: The risks of the procedure as well as expectations for recovery and outcomes were splint in detail to him.  He was counseled on the risks for bleeding infection and pain after the procedure.  He was instructed to continue to use contraception until he had proven azoospermia on a semen specimen.  This would normally be collected at least 3 months after the procedure.  He was instructed to hold all anticoagulants medications for one week prior to the procedure.  He was also instructed to shave the scrotum prior to procedure.  It was recommended that he have someone else drive him home after his vasectomy.  In light of these risks and expectations he would like to proceed.  We are scheduling a vasectomy in the office in the near future.  This visit today was performed via video.  He understands that because of this I was not able to examine the testicles in person today.  I will perform an examination at the beginning of the vasectomy and he understands that there is a small chance the procedure may need to be moved to the operating room.    Keron Mcintyre M.D.      Video Start Time: 10:03 AM  Video-Visit Details    Type of service:  Video Visit    Video End Time:10:07 AM    Originating Location (pt. Location): Home    Distant Location (provider location):  [facility] UROLOGY [facility]     Platform used for " Video Visit: Valarie        Again, thank you for allowing me to participate in the care of your patient.      Sincerely,    Keron Mcintyre MD

## 2021-05-06 NOTE — LETTER
Date:May 9, 2021      Patient was self referred, no letter generated. Do not send.        [facility] Health Information

## 2021-05-06 NOTE — PROGRESS NOTES
*SEND LINK TO CELL PHONE*    Delbert is a 40 year old who is being evaluated via a billable video visit.      How would you like to obtain your AVS? MyChart  If the video visit is dropped, the invitation should be resent by: Text to cell phone: [phone]  Will anyone else be joining your video visit? No        VASECTOMY CONSULTATION NOTE  [facility]  [phone]  DATE OF VISIT: 5/6/2021    PATIENT NAME: Abdon Wang    YOB: 1980      REASON FOR CONSULTATION: Mr. Abdon Wang is a 40 year old year old gentleman who is being seen as a virtual visit in the urology clinic today requesting a vasectomy. He has 3 children and he wishes to have a vasectomy for birth control. He has no prior urologic history and has had no prior surgery on the testicles. He has no symptoms in the testicles.    PAST MEDICAL HISTORY: History reviewed. No pertinent past medical history.    PAST SURGICAL HISTORY:   Past Surgical History:   Procedure Laterality Date     IRRIGATION AND DEBRIDEMENT HAND, COMBINED Right 6/28/2018    Procedure: COMBINED IRRIGATION AND DEBRIDEMENT HAND;  Irrigation and debridement and incision and drainage of right hand abscess;  Surgeon: Maxim Franks MD;  Location:  OR       MEDICATIONS:   Current Outpatient Medications:      fluticasone (FLONASE) 50 MCG/ACT spray, Spray 1 spray into both nostrils daily, Disp: , Rfl:     ALLERGIES:   Allergies   Allergen Reactions     Seasonal Allergies        FAMILY HISTORY: No family history on file.    SOCIAL HISTORY:   Social History     Socioeconomic History     Marital status: Single     Spouse name: Not on file     Number of children: Not on file     Years of education: Not on file     Highest education level: Not on file   Occupational History     Not on file   Social Needs     Financial resource strain: Not on file     Food insecurity     Worry: Not on file     Inability: Not on file     Transportation needs     Medical: Not on  "file     Non-medical: Not on file   Tobacco Use     Smoking status: Never Smoker     Smokeless tobacco: Never Used   Substance and Sexual Activity     Alcohol use: Yes     Drug use: No     Sexual activity: Not on file   Lifestyle     Physical activity     Days per week: Not on file     Minutes per session: Not on file     Stress: Not on file   Relationships     Social connections     Talks on phone: Not on file     Gets together: Not on file     Attends Tenriism service: Not on file     Active member of club or organization: Not on file     Attends meetings of clubs or organizations: Not on file     Relationship status: Not on file     Intimate partner violence     Fear of current or ex partner: Not on file     Emotionally abused: Not on file     Physically abused: Not on file     Forced sexual activity: Not on file   Other Topics Concern     Parent/sibling w/ CABG, MI or angioplasty before 65F 55M? Not Asked   Social History Narrative     Not on file       REVIEW OF SYSTEMS:  Skin: No rash, pruritis, or skin pigmentation  Eyes: No changes in vision  Ears/Nose/Throat: No changes in hearing, no nosebleeds  Respiratory: No shortness of breath, dyspnea on exertion, cough, or hemoptysis  Cardiovascular: No chest pain or palpitations  Gastrointestinal: No diarrhea or constipation. No abdominal pain. No hematochezia  Genitourinary: see HPI  Musculoskeletal: No pain or swelling of joints, normal range of motion  Neurologic: No weakness or tremors  Psychiatric: No recent changes in memory or mood  Hematologic/Lymphatic/Immunologic: No easy bruising or enlarged lymph nodes  Endocrine: No weight gain or loss      HEIGHT: 5' 11\"     WEIGHT: 175 lbs 0 oz   BP: Data Unavailable    PULSE: Data Unavailable    EXAM:   General: Alert and oriented to time, place, and self. In NAD   HEENT: Head AT/NC, EOMI, CN Grossly intact   Lungs: no respiratory distress, or pursed lip breathing   Heart: No obvious jugular venous distension " present   Musculoskeltal: Normal movements. Normal appearing musculature  Skin: no suspicious lesions or rashes   Neuro: Alert, oriented, speech and mentation normal; moving all 4 extremities equally.   Psych: affect and mood normal      DIAGNOSIS: Request for sterilization    PLAN: The risks of the procedure as well as expectations for recovery and outcomes were splint in detail to him.  He was counseled on the risks for bleeding infection and pain after the procedure.  He was instructed to continue to use contraception until he had proven azoospermia on a semen specimen.  This would normally be collected at least 3 months after the procedure.  He was instructed to hold all anticoagulants medications for one week prior to the procedure.  He was also instructed to shave the scrotum prior to procedure.  It was recommended that he have someone else drive him home after his vasectomy.  In light of these risks and expectations he would like to proceed.  We are scheduling a vasectomy in the office in the near future.  This visit today was performed via video.  He understands that because of this I was not able to examine the testicles in person today.  I will perform an examination at the beginning of the vasectomy and he understands that there is a small chance the procedure may need to be moved to the operating room.    Keron Mcintyre M.D.      Video Start Time: 10:03 AM  Video-Visit Details    Type of service:  Video Visit    Video End Time:10:07 AM    Originating Location (pt. Location): Home    Distant Location (provider location):  [facility] [place]     Platform used for Video Visit: Valarie

## 2021-05-09 ENCOUNTER — HEALTH MAINTENANCE LETTER (OUTPATIENT)
Age: 41
End: 2021-05-09

## 2021-06-11 ENCOUNTER — OFFICE VISIT (OUTPATIENT)
Dept: UROLOGY | Facility: CLINIC | Age: 41
End: 2021-06-11
Payer: COMMERCIAL

## 2021-06-11 VITALS
DIASTOLIC BLOOD PRESSURE: 72 MMHG | BODY MASS INDEX: 24.5 KG/M2 | SYSTOLIC BLOOD PRESSURE: 138 MMHG | WEIGHT: 175 LBS | HEIGHT: 71 IN

## 2021-06-11 DIAGNOSIS — Z30.2 ENCOUNTER FOR STERILIZATION: Primary | ICD-10-CM

## 2021-06-11 PROCEDURE — 88302 TISSUE EXAM BY PATHOLOGIST: CPT | Performed by: PATHOLOGY

## 2021-06-11 PROCEDURE — 55250 REMOVAL OF SPERM DUCT(S): CPT | Performed by: UROLOGY

## 2021-06-11 RX ORDER — LIDOCAINE HYDROCHLORIDE 20 MG/ML
20 INJECTION, SOLUTION INFILTRATION; PERINEURAL ONCE
Status: COMPLETED | OUTPATIENT
Start: 2021-06-11 | End: 2021-06-11

## 2021-06-11 RX ADMIN — LIDOCAINE HYDROCHLORIDE 20 ML: 20 INJECTION, SOLUTION INFILTRATION; PERINEURAL at 09:45

## 2021-06-11 ASSESSMENT — PAIN SCALES - GENERAL: PAINLEVEL: NO PAIN (0)

## 2021-06-11 ASSESSMENT — MIFFLIN-ST. JEOR: SCORE: 1715.92

## 2021-06-11 NOTE — NURSING NOTE
Chief Complaint   Patient presents with     Sterilization     Vasectomy     Patient has signed the consent form stating that we will be doing a bilateral vasectomy today and that this is the correct procedure.  I verbally confirmed the patient's identity using two indicators, relevant allergies, and that the correct equipment was available. Patient was cleaned and prepped according to the appropriate policy.  Equipment was prepped in a sterile fashion and MD was informed that patient was ready.    Consent read and signed: Yes  Aspirin/blood thinning products stopped 7 days prior to procedure: Yes  Allergies   Allergen Reactions     Seasonal Allergies        Physician performed procedure.  After the procedure the patient was instructed to wait approximately three months and at least 30 ejaculations prior to returning a sample to confirm sterility.  The patient was instructed to bring in sample within 3-6 hours post sample ejaculation.  Any additional medications after the procedure were sent to the patient's pharmacy and instructions were given according to company protocol and the performing physician.  The physician performing the procedure prescribed as they felt appropriate.  Patient was also instructed to avoid heavy lifting or strenuous activity for 10-14 days and to ice the scrotum.  Samples from the R and L vas deferens were sent to the lab and an order was placed for future semen analysis.      The following medication was given:     MEDICATION:  Lidocaine 2% Soln  ROUTE: Infiltration  SITE: Scrotum  DOSE: 12 mL  LOT #: -[id number]  : Hospira  EXPIRATION DATE: 1 JAN 2022  NDC#:  0409-422-16  Was there drug waste? Yes  Amount of drug waste (mL): 8 mL.  Reason for waste:  As per MD  Multi-dose vial: Yes    Shauna Contreras, EMT

## 2021-06-11 NOTE — PATIENT INSTRUCTIONS
POST VASECTOMY INSTRUCTIONS    1.) If you have any concerns or questions, please contact our office at [phone].     2.) It is okay to take a shower, however, do not soak in water (bath,swimming, hot tub,etc....) until your incision is healed.    3.) You might notice some swelling, mild bruising, and discomfort for several days after your vasectomy. This is to be expected. For at least the next 24 hours, an ice pack should be applied for 20 minutes every hour that you are awake. Ice will help with discomfort and swelling. Do not place directly on the skin.    4.) No intercourse, strenuous activity or exercise for at least 7-10 days, even if you feel fine.    5.) You need to wear good scrotal support while you are healing. We strongly recommend an athletic supporter or a pair of regular briefs that are one size too small. Boxer briefs do not offer enough support.    6.) Tylenol as directed on the bottle is preferred for discomfort. Please avoid any blood thinning products such as ibuprofen and aspirin (Motrin, Advil, Excedrin, Aleve, ect..) for at least the next week.    7.) It is normal to have mild drainage from the incision area for several days. However, please contact our office if you notice: bright red blood that does not stop after three days, increased pain, heat at the incision, red streaks, foul smelling discharge, or if you start to run a fever.     8.) YOU MUST CONTINUE BIRTH CONTROL UNTIL WE CONFIRM YOUR STERILITY.  This process can take up to a year to complete (rare occurrence).     9.) You have been given a form with specimen cup and instructions for your follow up specimen. You will be cleared once we receive ONE negative specimen. If your specimen comes back positive (sperm seen) you will be asked to repeat the test. This does not mean that your vasectomy has failed.

## 2021-06-11 NOTE — PROGRESS NOTES
OFFICE VASECTOMY OPERATIVE NOTE  [facility]  ([phone]    DATE: 06/11/21  PATIENT: Abdon Wang    YOB: 1980    Abdon Wang is a 40 year old male.  He has 3 children and he wishes a vasectomy for birth control.  He has read the brochure and he has shaved himself.  I reviewed the vasectomy procedure with him explaining that it would be done with a local anesthetic given just in the location where the vasectomy would be done.  It would be done through scalpel-less incisions with the removal of segments of the vasa, cauterization of the ends, and burying the ends separate with sutures.      Pt. Understands:  1/1000-1/3000 risk of future pregnancy even with perfectly done vasectomy  -vasectomy is a permanent procedure    -he may cryopreserve sperm if he wishes   -1-5% risk of post-vasectomy pain syndrome   -1-5% risk of complication, primarily infection or bleeding  - he needs to have a semen sample that shows no sperm before getting approval for unprotected intercourse.      Complications such as bleeding, infection, and damage to other tissues in the area were discussed.  I recommended that an ice bag be placed on the scrotum off and on tonight to help reduce pain and swelling.      He was reminded that he was not sterile immediately after the vasectomy that it would take at least 20 ejaculations to empty the vas of any remaining sperm.  He was not to provide a semen sample until after the 20th ejaculation and not before 12 weeks after the vas. He was  to fulfill both of those requirements.   He understands it is his responsibility to find out the results of the vas before proceeding with intercourse without birth control protection.  Other items discussed were activity afterwards, returning to work, voluntary physical activity,  resuming sexual activity, clothing to wear, bathing, and care of the vas site and expected changes in the site as healing progresses.  After  signing the permit, bilateral vasectomy was done as described through scalpel-less incisions.       ANESTHESIA: Local    DETAILS OF PROCEDURE: The risks of the procedure were explained in detail to the patient and informed consent was obtained. The patient was placed supine on the procedure table and the penis and scrotum were prepped and draped in the standard sterile fashion. The right vas deferens was isolated and brought up to the median raphe of the scrotum. 1% lidocaine local anesthesia was used to infiltrate the skin and the spermatic cord. A sharp hemostat was used to make a skin puncture. Adventitial tissues were swept away from the vas. A 1 cm segment of the vas was excised and sent for pathology. The proximal and distal lumina of the vas were cauterized and then each segment was tied off in a knuckling-fashion with a 3-0 chromic suture. Hemostasis was ensured and the segments were released back into the scrotum. Next the left vas was brought up to the same incision and a vasectomy was performed in the similar fashion. At the end of the procedure a single 3-0 chromic suture was placed in the skin.     COMPLICATIONS: None    DISMISSAL INSTRUCTIONS:  - Ice pack to scrotum 15 to 20 minutes each hour awake for 36 to 40 hours.  - No strenuous activity or ejaculation for 14 days.  - No unprotected sexual activity until proven azoospermia on semen samples at 3 months.  - Referred to patient handout for normal postop expectations and indications to contact nurse or physician.    M.D.: Keron Mcintyre M.D.

## 2021-06-11 NOTE — LETTER
Date:June 27, 2021      Patient was self referred, no letter generated. Do not send.        [facility] Health Information

## 2021-06-11 NOTE — LETTER
6/11/2021       RE: Abdon Wang  [address] [facility] [id number]     Dear Colleague,    Thank you for referring your patient, Abdon Wang, to the [facility] [place] [facility]. Please see a copy of my visit note below.    OFFICE VASECTOMY OPERATIVE NOTE  [facility]  ([phone]    DATE: 06/11/21  PATIENT: Abdon Wang    YOB: 1980    Abdon Wang is a 40 year old male.  He has 3 children and he wishes a vasectomy for birth control.  He has read the brochure and he has shaved himself.  I reviewed the vasectomy procedure with him explaining that it would be done with a local anesthetic given just in the location where the vasectomy would be done.  It would be done through scalpel-less incisions with the removal of segments of the vasa, cauterization of the ends, and burying the ends separate with sutures.      Pt. Understands:  1/1000-1/3000 risk of future pregnancy even with perfectly done vasectomy  -vasectomy is a permanent procedure    -he may cryopreserve sperm if he wishes   -1-5% risk of post-vasectomy pain syndrome   -1-5% risk of complication, primarily infection or bleeding  - he needs to have a semen sample that shows no sperm before getting approval for unprotected intercourse.      Complications such as bleeding, infection, and damage to other tissues in the area were discussed.  I recommended that an ice bag be placed on the scrotum off and on tonight to help reduce pain and swelling.      He was reminded that he was not sterile immediately after the vasectomy that it would take at least 20 ejaculations to empty the vas of any remaining sperm.  He was not to provide a semen sample until after the 20th ejaculation and not before 12 weeks after the vas. He was  to fulfill both of those requirements.   He understands it is his responsibility to find out the results of  the vas before proceeding with intercourse without birth control protection.  Other items discussed were activity afterwards, returning to work, voluntary physical activity,  resuming sexual activity, clothing to wear, bathing, and care of the vas site and expected changes in the site as healing progresses.  After signing the permit, bilateral vasectomy was done as described through scalpel-less incisions.       ANESTHESIA: Local    DETAILS OF PROCEDURE: The risks of the procedure were explained in detail to the patient and informed consent was obtained. The patient was placed supine on the procedure table and the penis and scrotum were prepped and draped in the standard sterile fashion. The right vas deferens was isolated and brought up to the median raphe of the scrotum. 1% lidocaine local anesthesia was used to infiltrate the skin and the spermatic cord. A sharp hemostat was used to make a skin puncture. Adventitial tissues were swept away from the vas. A 1 cm segment of the vas was excised and sent for pathology. The proximal and distal lumina of the vas were cauterized and then each segment was tied off in a knuckling-fashion with a 3-0 chromic suture. Hemostasis was ensured and the segments were released back into the scrotum. Next the left vas was brought up to the same incision and a vasectomy was performed in the similar fashion. At the end of the procedure a single 3-0 chromic suture was placed in the skin.     COMPLICATIONS: None    DISMISSAL INSTRUCTIONS:  - Ice pack to scrotum 15 to 20 minutes each hour awake for 36 to 40 hours.  - No strenuous activity or ejaculation for 14 days.  - No unprotected sexual activity until proven azoospermia on semen samples at 3 months.  - Referred to patient handout for normal postop expectations and indications to contact nurse or physician.    M.D.: Keron Mcintyre M.D.         Again, thank you for allowing me to participate in the care of your patient.       Sincerely,    Keron Mcintyre MD

## 2021-06-15 LAB — COPATH REPORT: NORMAL

## 2021-09-14 ENCOUNTER — LAB (OUTPATIENT)
Dept: LAB | Facility: CLINIC | Age: 41
End: 2021-09-14
Payer: COMMERCIAL

## 2021-09-14 DIAGNOSIS — Z30.2 ENCOUNTER FOR VASECTOMY: Primary | ICD-10-CM

## 2021-09-14 LAB — SEMEN ANALYSIS P VAS PNL: NORMAL

## 2021-09-14 PROCEDURE — 89321 SEMEN ANAL SPERM DETECTION: CPT

## 2021-10-24 ENCOUNTER — HEALTH MAINTENANCE LETTER (OUTPATIENT)
Age: 41
End: 2021-10-24

## 2022-06-05 ENCOUNTER — HEALTH MAINTENANCE LETTER (OUTPATIENT)
Age: 42
End: 2022-06-05

## 2022-10-15 ENCOUNTER — HEALTH MAINTENANCE LETTER (OUTPATIENT)
Age: 42
End: 2022-10-15

## 2023-06-11 ENCOUNTER — HEALTH MAINTENANCE LETTER (OUTPATIENT)
Age: 43
End: 2023-06-11

## 2024-08-04 ENCOUNTER — HEALTH MAINTENANCE LETTER (OUTPATIENT)
Age: 44
End: 2024-08-04

## (undated) DEVICE — LINEN HALF SHEET 5512

## (undated) DEVICE — CAST PADDING 4" STERILE 9044S

## (undated) DEVICE — APPLICATOR COTTON TIP 6"X2 STERILE LF 6012

## (undated) DEVICE — GLOVE PROTEXIS BLUE W/NEU-THERA 8.5  2D73EB85

## (undated) DEVICE — KIT CULTURE ESWAB AEROBE/ANAEROBE WHITE TOP R723480

## (undated) DEVICE — GLOVE PROTEXIS POWDER FREE 8.5 ORTHOPEDIC 2D73ET85

## (undated) DEVICE — SOL NACL 0.9% IRRIG 1000ML BOTTLE 2F7124

## (undated) DEVICE — PREP SKIN SCRUB TRAY 4461A

## (undated) DEVICE — PACK HAND SOP32HARMO

## (undated) DEVICE — BAG CLEAR TRASH 1.3M 39X33" P4040C

## (undated) DEVICE — LINEN FULL SHEET 5511

## (undated) DEVICE — TUBING SUCTION 12"X1/4" N612

## (undated) DEVICE — TOURNIQUET CUFF 18" REPRO RED 60-7070-103

## (undated) DEVICE — PREP POVIDONE IODINE SCRUB 7.5% 4OZ APL82212

## (undated) DEVICE — GLOVE ESTEEM POWDER FREE SMT 8.5 2D72PT85

## (undated) DEVICE — GOWN IMPERVIOUS SPECIALTY XLG/XLONG 32474

## (undated) DEVICE — PREP POVIDONE IODINE SOLUTION 10% 4OZ

## (undated) DEVICE — SUCTION TIP YANKAUER W/O VENT K86

## (undated) DEVICE — TUBE CULTURE ANAEROBIC PORT-A-CUL 11ML

## (undated) DEVICE — SYR BULB IRRIG 50ML LATEX FREE 0035280

## (undated) RX ORDER — LIDOCAINE HYDROCHLORIDE 10 MG/ML
INJECTION, SOLUTION EPIDURAL; INFILTRATION; INTRACAUDAL; PERINEURAL
Status: DISPENSED
Start: 2018-06-28

## (undated) RX ORDER — ONDANSETRON 2 MG/ML
INJECTION INTRAMUSCULAR; INTRAVENOUS
Status: DISPENSED
Start: 2018-06-28

## (undated) RX ORDER — LIDOCAINE HYDROCHLORIDE 10 MG/ML
INJECTION, SOLUTION EPIDURAL; INFILTRATION; INTRACAUDAL; PERINEURAL
Status: DISPENSED
Start: 2018-09-20

## (undated) RX ORDER — KETOROLAC TROMETHAMINE 30 MG/ML
INJECTION, SOLUTION INTRAMUSCULAR; INTRAVENOUS
Status: DISPENSED
Start: 2018-06-28

## (undated) RX ORDER — FENTANYL CITRATE 50 UG/ML
INJECTION, SOLUTION INTRAMUSCULAR; INTRAVENOUS
Status: DISPENSED
Start: 2018-06-28

## (undated) RX ORDER — GLYCOPYRROLATE 0.2 MG/ML
INJECTION INTRAMUSCULAR; INTRAVENOUS
Status: DISPENSED
Start: 2018-06-28

## (undated) RX ORDER — DEXAMETHASONE SODIUM PHOSPHATE 4 MG/ML
INJECTION, SOLUTION INTRA-ARTICULAR; INTRALESIONAL; INTRAMUSCULAR; INTRAVENOUS; SOFT TISSUE
Status: DISPENSED
Start: 2018-06-28

## (undated) RX ORDER — PROPOFOL 10 MG/ML
INJECTION, EMULSION INTRAVENOUS
Status: DISPENSED
Start: 2018-06-28